# Patient Record
Sex: FEMALE | Race: OTHER | NOT HISPANIC OR LATINO | Employment: FULL TIME | URBAN - METROPOLITAN AREA
[De-identification: names, ages, dates, MRNs, and addresses within clinical notes are randomized per-mention and may not be internally consistent; named-entity substitution may affect disease eponyms.]

---

## 2023-05-18 LAB
CHLAMYDIA TRACH., AMPLIFIED: NEGATIVE
N. GONORRHEA, AMPLIFIED: NEGATIVE

## 2023-05-19 LAB — URINE CULTURE: NORMAL

## 2023-05-27 LAB
ABO GROUP (TYPE) IN BLOOD: NORMAL
ANTIBODY SCREEN: NORMAL
ERYTHROCYTE DISTRIBUTION WIDTH (RATIO) BY AUTOMATED COUNT: 11.9 % (ref 11.5–14.5)
ERYTHROCYTE MEAN CORPUSCULAR HEMOGLOBIN CONCENTRATION (G/DL) BY AUTOMATED: 33.5 G/DL (ref 32–36)
ERYTHROCYTE MEAN CORPUSCULAR VOLUME (FL) BY AUTOMATED COUNT: 88 FL (ref 80–100)
ERYTHROCYTES (10*6/UL) IN BLOOD BY AUTOMATED COUNT: 4.52 X10E12/L (ref 4–5.2)
HEMATOCRIT (%) IN BLOOD BY AUTOMATED COUNT: 39.7 % (ref 36–46)
HEMOGLOBIN (G/DL) IN BLOOD: 13.3 G/DL (ref 12–16)
HEPATITIS B VIRUS SURFACE AG PRESENCE IN SERUM: NONREACTIVE
HIV 1/ 2 AG/AB SCREEN: NONREACTIVE
LEUKOCYTES (10*3/UL) IN BLOOD BY AUTOMATED COUNT: 11.7 X10E9/L (ref 4.4–11.3)
PLATELETS (10*3/UL) IN BLOOD AUTOMATED COUNT: 346 X10E9/L (ref 150–450)
REFLEX ADDED, ANEMIA PANEL: ABNORMAL
RH FACTOR: NORMAL

## 2023-05-28 LAB
RUBELLA VIRUS IGG AB: NEGATIVE
SYPHILIS TOTAL AB: NONREACTIVE

## 2023-08-27 PROBLEM — N93.0 BLEEDING AFTER INTERCOURSE: Status: ACTIVE | Noted: 2023-08-27

## 2023-08-27 PROBLEM — N92.1 BREAKTHROUGH BLEEDING ON BIRTH CONTROL PILLS: Status: ACTIVE | Noted: 2023-08-27

## 2023-08-27 PROBLEM — L70.9 ACNE: Status: ACTIVE | Noted: 2023-08-27

## 2023-08-27 PROBLEM — R30.0 DYSURIA: Status: ACTIVE | Noted: 2023-08-27

## 2023-08-27 PROBLEM — R10.9 ABDOMINAL DISCOMFORT: Status: ACTIVE | Noted: 2023-08-27

## 2023-08-27 PROBLEM — N94.6 DYSMENORRHEA: Status: ACTIVE | Noted: 2023-08-27

## 2023-08-27 PROBLEM — R63.5 ABNORMAL WEIGHT GAIN: Status: ACTIVE | Noted: 2023-08-27

## 2023-08-27 PROBLEM — K21.9 GASTROESOPHAGEAL REFLUX DISEASE WITHOUT ESOPHAGITIS: Status: ACTIVE | Noted: 2023-08-27

## 2023-08-27 PROBLEM — M54.50 LOW BACK PAIN: Status: ACTIVE | Noted: 2023-08-27

## 2023-08-27 PROBLEM — N92.6 IRREGULAR MENSES: Status: ACTIVE | Noted: 2023-08-27

## 2023-08-27 PROBLEM — R82.998 LEUKOCYTES IN URINE: Status: ACTIVE | Noted: 2023-08-27

## 2023-08-27 PROBLEM — R35.0 URINARY FREQUENCY: Status: ACTIVE | Noted: 2023-08-27

## 2023-08-27 PROBLEM — M41.9 SCOLIOSIS: Status: ACTIVE | Noted: 2023-08-27

## 2023-08-27 RX ORDER — SODIUM SULFACETAMIDE AND SULFUR 80; 40 MG/ML; MG/ML
SOLUTION TOPICAL
COMMUNITY
Start: 2021-08-16 | End: 2023-10-05

## 2023-08-27 RX ORDER — FAMOTIDINE 20 MG/1
1 TABLET, FILM COATED ORAL EVERY 12 HOURS
COMMUNITY
Start: 2023-05-17 | End: 2024-01-03 | Stop reason: WASHOUT

## 2023-08-27 RX ORDER — CLINDAMYCIN HYDROCHLORIDE 300 MG/1
1 CAPSULE ORAL EVERY 12 HOURS
COMMUNITY
Start: 2022-06-21 | End: 2023-10-05 | Stop reason: WASHOUT

## 2023-08-27 RX ORDER — FOLIC ACID, .BETA.-CAROTENE, ASCORBIC ACID, CHOLECALCIFEROL, .ALPHA.-TOCOPHEROL ACETATE, DL-, THIAMINE MONONITRATE, RIBOFLAVIN, NIACINAMIDE, PYRIDOXINE HYDROCHLORIDE, CYANOCOBALAMIN, CALCIUM PANTOTHENATE, CALCIUM CARBONATE, FERROUS FUMARATE, AND ZINC OXIDE 1; 1000; 100; 400; 30; 3; 3; 15; 20; 12; 7; 200; 29; 20 MG/1; [IU]/1; MG/1; [IU]/1; [IU]/1; MG/1; MG/1; MG/1; MG/1; UG/1; MG/1; MG/1; MG/1; MG/1
1 TABLET, CHEWABLE ORAL DAILY
COMMUNITY
Start: 2022-11-10

## 2023-09-07 PROBLEM — Z34.02 ENCOUNTER FOR SUPERVISION OF NORMAL FIRST PREGNANCY IN SECOND TRIMESTER (HHS-HCC): Status: ACTIVE | Noted: 2023-09-07

## 2023-09-07 PROBLEM — O43.119: Status: ACTIVE | Noted: 2023-09-07

## 2023-09-13 LAB
ERYTHROCYTE DISTRIBUTION WIDTH (RATIO) BY AUTOMATED COUNT: 12.4 % (ref 11.5–14.5)
ERYTHROCYTE MEAN CORPUSCULAR HEMOGLOBIN CONCENTRATION (G/DL) BY AUTOMATED: 33.3 G/DL (ref 32–36)
ERYTHROCYTE MEAN CORPUSCULAR VOLUME (FL) BY AUTOMATED COUNT: 94 FL (ref 80–100)
ERYTHROCYTES (10*6/UL) IN BLOOD BY AUTOMATED COUNT: 4.11 X10E12/L (ref 4–5.2)
GLUCOSE, 1 HR SCREEN, PREG: 86 MG/DL
HEMATOCRIT (%) IN BLOOD BY AUTOMATED COUNT: 38.7 % (ref 36–46)
HEMOGLOBIN (G/DL) IN BLOOD: 12.9 G/DL (ref 12–16)
LEUKOCYTES (10*3/UL) IN BLOOD BY AUTOMATED COUNT: 11.3 X10E9/L (ref 4.4–11.3)
PLATELETS (10*3/UL) IN BLOOD AUTOMATED COUNT: 276 X10E9/L (ref 150–450)
REFLEX ADDED, ANEMIA PANEL: NORMAL
SYPHILIS TOTAL AB: NONREACTIVE

## 2023-10-04 PROBLEM — N93.0 BLEEDING AFTER INTERCOURSE: Status: RESOLVED | Noted: 2023-08-27 | Resolved: 2023-10-04

## 2023-10-04 PROBLEM — N94.6 DYSMENORRHEA: Status: RESOLVED | Noted: 2023-08-27 | Resolved: 2023-10-04

## 2023-10-04 PROBLEM — N92.1 BREAKTHROUGH BLEEDING ON BIRTH CONTROL PILLS: Status: RESOLVED | Noted: 2023-08-27 | Resolved: 2023-10-04

## 2023-10-04 PROBLEM — N92.6 IRREGULAR MENSES: Status: RESOLVED | Noted: 2023-08-27 | Resolved: 2023-10-04

## 2023-10-04 PROBLEM — Z34.03 ENCOUNTER FOR SUPERVISION OF NORMAL FIRST PREGNANCY IN THIRD TRIMESTER (HHS-HCC): Status: ACTIVE | Noted: 2023-09-07

## 2023-10-04 PROBLEM — R30.0 DYSURIA: Status: RESOLVED | Noted: 2023-08-27 | Resolved: 2023-10-04

## 2023-10-05 ENCOUNTER — ROUTINE PRENATAL (OUTPATIENT)
Dept: OBSTETRICS AND GYNECOLOGY | Facility: CLINIC | Age: 23
End: 2023-10-05
Payer: COMMERCIAL

## 2023-10-05 VITALS — BODY MASS INDEX: 23.81 KG/M2 | WEIGHT: 126 LBS | DIASTOLIC BLOOD PRESSURE: 70 MMHG | SYSTOLIC BLOOD PRESSURE: 102 MMHG

## 2023-10-05 DIAGNOSIS — Z23 NEED FOR DIPHTHERIA-TETANUS-PERTUSSIS (TDAP) VACCINE: ICD-10-CM

## 2023-10-05 DIAGNOSIS — O43.113 CIRCUMVALLATE PLACENTA IN THIRD TRIMESTER (HHS-HCC): Primary | ICD-10-CM

## 2023-10-05 PROCEDURE — 90471 IMMUNIZATION ADMIN: CPT | Performed by: OBSTETRICS & GYNECOLOGY

## 2023-10-05 PROCEDURE — 90715 TDAP VACCINE 7 YRS/> IM: CPT | Performed by: OBSTETRICS & GYNECOLOGY

## 2023-10-05 PROCEDURE — 0501F PRENATAL FLOW SHEET: CPT | Performed by: OBSTETRICS & GYNECOLOGY

## 2023-10-05 NOTE — PROGRESS NOTES
"Subjective   Patient ID [id number]   Marilou Kaufman is a 23 y.o.  at 28w5d with a working estimated date of delivery of 2023, by Ultrasound who presents for a routine prenatal visit. She denies vaginal bleeding, leakage of fluid, decreased fetal movements, or contractions. She does note reflux about half the days. We discussed taking famotidine regularly to prevent. She also notes hemorrhoids. These are not painful but can bleed. She denies constipation. Will try Preparation H.     Her pregnancy is complicated by:  Circumvalate placenta.    Objective   Physical Exam:   Weight: 57.2 kg (126 lb)  Expected Total Weight Gain: 11.5 kg (25 lb)-16 kg (35 lb)   Pregravid BMI: 18.90  BP: 102/70  Fetal Heart Rate: 125 Fundal Height (cm): 28 cm Presentation: Vertex           Prenatal Labs  Urine Dip:  No results found for: \"KETONESU\", \"GLUCOSEUR\", \"LEUKOCYTESUR\"  Lab Results   Component Value Date    HGB 12.9 2023    HCT 38.7 2023    HEPBSAG NONREACTIVE 2023            Assessment/Plan   Problem List Items Addressed This Visit             ICD-10-CM    Circumvallate placenta - Primary O43.119     Serial follow up imaging is planned per [facility].           Continue prenatal vitamin.  Labs reviewed.  Follow up ultrasound is next Friday.  Expected mode of delivery vaginal.  Follow up in 1 week for a routine prenatal visit.  "

## 2023-10-13 ENCOUNTER — ANCILLARY PROCEDURE (OUTPATIENT)
Dept: RADIOLOGY | Facility: CLINIC | Age: 23
End: 2023-10-13
Payer: COMMERCIAL

## 2023-10-13 DIAGNOSIS — O43.113 CIRCUMVALLATE PLACENTA IN THIRD TRIMESTER (HHS-HCC): ICD-10-CM

## 2023-10-13 PROCEDURE — 76816 OB US FOLLOW-UP PER FETUS: CPT

## 2023-10-13 PROCEDURE — 76819 FETAL BIOPHYS PROFIL W/O NST: CPT | Performed by: OBSTETRICS & GYNECOLOGY

## 2023-10-13 PROCEDURE — 76816 OB US FOLLOW-UP PER FETUS: CPT | Performed by: OBSTETRICS & GYNECOLOGY

## 2023-10-15 PROBLEM — O43.123 VELAMENTOUS INSERTION OF UMBILICAL CORD IN THIRD TRIMESTER (HHS-HCC): Status: ACTIVE | Noted: 2023-10-15

## 2023-10-15 PROBLEM — Z3A.30 30 WEEKS GESTATION OF PREGNANCY (HHS-HCC): Status: ACTIVE | Noted: 2023-10-15

## 2023-10-15 NOTE — ASSESSMENT & PLAN NOTE
NIPS negative. Vegan diet.   Circumvallate placenta with velamentous cord insertion are noted on MFM imaging. Growth remains AGA. 36 week imaging is planned per [facility].

## 2023-10-19 ENCOUNTER — ROUTINE PRENATAL (OUTPATIENT)
Dept: OBSTETRICS AND GYNECOLOGY | Facility: CLINIC | Age: 23
End: 2023-10-19
Payer: COMMERCIAL

## 2023-10-19 VITALS — WEIGHT: 129 LBS | SYSTOLIC BLOOD PRESSURE: 110 MMHG | BODY MASS INDEX: 24.37 KG/M2 | DIASTOLIC BLOOD PRESSURE: 60 MMHG

## 2023-10-19 DIAGNOSIS — O43.123 VELAMENTOUS INSERTION OF UMBILICAL CORD IN THIRD TRIMESTER (HHS-HCC): ICD-10-CM

## 2023-10-19 DIAGNOSIS — Z23 NEED FOR DIPHTHERIA-TETANUS-PERTUSSIS (TDAP) VACCINE: ICD-10-CM

## 2023-10-19 DIAGNOSIS — Z3A.30 30 WEEKS GESTATION OF PREGNANCY (HHS-HCC): ICD-10-CM

## 2023-10-19 DIAGNOSIS — O43.113 CIRCUMVALLATE PLACENTA IN THIRD TRIMESTER (HHS-HCC): Primary | ICD-10-CM

## 2023-10-19 PROCEDURE — 0501F PRENATAL FLOW SHEET: CPT | Performed by: OBSTETRICS & GYNECOLOGY

## 2023-10-19 NOTE — PROGRESS NOTES
"Subjective   Patient ID [id number]   Marilou Kaufman is a 23 y.o.  at 30w5d with a working estimated date of delivery of 2023, by Ultrasound who presents for a routine prenatal visit. She denies vaginal bleeding, leakage of fluid, decreased fetal movements, or contractions. She is doing well.     Her pregnancy is complicated by:  Velamentous and circumvallate placenta.    Objective   Physical Exam:   Weight: 58.5 kg (129 lb)  Expected Total Weight Gain: 11.5 kg (25 lb)-16 kg (35 lb)   Pregravid BMI: 18.90  BP: 110/60  Fetal Heart Rate: 140 Fundal Height (cm): 29 cm Presentation: Vertex           Prenatal Labs  Urine Dip:  No results found for: \"KETONESU\", \"GLUCOSEUR\", \"LEUKOCYTESUR\"  Lab Results   Component Value Date    HGB 12.9 2023    HCT 38.7 2023    ABO O 2023    HEPBSAG NONREACTIVE 2023     No results found for: \"PAPPA\", \"AFP\", \"HCG\", \"ESTRIOL\", \"INHBA\"         Assessment/Plan   Problem List Items Addressed This Visit             ICD-10-CM    Circumvallate placenta - Primary O43.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 Vegan diet.   Circumvallate placenta with velamentous cord insertion are noted on MFM imaging. Growth remains AGA. 36 week imaging is planned per M.           Other Visit Diagnoses         Codes    Need for diphtheria-tetanus-pertussis (Tdap) vaccine     Z23    Relevant Orders    Tdap vaccine, age 7 years and older  (BOOSTRIX)          Continue prenatal vitamin.  Labs reviewed.  mode of delivery vaginal  Follow up in 2 week for a routine prenatal visit.  "

## 2023-10-31 PROBLEM — Z3A.32 32 WEEKS GESTATION OF PREGNANCY (HHS-HCC): Status: ACTIVE | Noted: 2023-10-15

## 2023-11-02 ENCOUNTER — ROUTINE PRENATAL (OUTPATIENT)
Dept: OBSTETRICS AND GYNECOLOGY | Facility: CLINIC | Age: 23
End: 2023-11-02
Payer: COMMERCIAL

## 2023-11-02 VITALS — BODY MASS INDEX: 24.75 KG/M2 | SYSTOLIC BLOOD PRESSURE: 118 MMHG | DIASTOLIC BLOOD PRESSURE: 60 MMHG | WEIGHT: 131 LBS

## 2023-11-02 DIAGNOSIS — Z3A.32 32 WEEKS GESTATION OF PREGNANCY (HHS-HCC): ICD-10-CM

## 2023-11-02 DIAGNOSIS — O43.113 CIRCUMVALLATE PLACENTA IN THIRD TRIMESTER (HHS-HCC): Primary | ICD-10-CM

## 2023-11-02 DIAGNOSIS — O43.123 VELAMENTOUS INSERTION OF UMBILICAL CORD IN THIRD TRIMESTER (HHS-HCC): ICD-10-CM

## 2023-11-02 PROCEDURE — 0501F PRENATAL FLOW SHEET: CPT | Performed by: OBSTETRICS & GYNECOLOGY

## 2023-11-02 NOTE — PROGRESS NOTES
"Subjective   Patient ID [id number]   Marilou Kaufman is a 23 y.o.  at 32w1d with a working estimated date of delivery of 2023, by Last Menstrual Period who presents for a routine prenatal visit. She denies vaginal bleeding, leakage of fluid, decreased fetal movements, or contractions. She notes reflux at night. She will increase famotidine to twice daily.      Objective   Physical Exam  Weight: 59.4 kg (131 lb)  Expected Total Weight Gain: 11.5 kg (25 lb)-16 kg (35 lb)   Pregravid BMI: 18.90  BP: 118/60  Fetal Heart Rate: 128 Fundal Height (cm): 31 cm    Prenatal Labs  Urine dip:  No results found for: \"KETONESU\", \"GLUCOSEUR\", \"LEUKOCYTESUR\"    Lab Results   Component Value Date    HGB 12.9 2023    HCT 38.7 2023    ABO O 2023    HEPBSAG NONREACTIVE 2023         Problem List Items Addressed This Visit       Circumvallate placenta - Primary    Overview     Serial follow up imaging is planned per [facility].          Velamentous insertion of umbilical cord in third trimester    Overview     Circumvallate placenta and velamentous cord insertion are confirmed at 30 week [facility] imaging. Plan is for recheck at 36 weeks gestation.          32 weeks gestation of pregnancy    Overview     NIPS negative. Vegan diet. Adequate protein intake is encouraged                Continue prenatal vitamin.  Labs reviewed.  Follow up in 2 weeks. USN in 4 weeks.  Follow up as scheduled for a routine prenatal visit.  "

## 2023-11-09 PROBLEM — Z3A.34 34 WEEKS GESTATION OF PREGNANCY (HHS-HCC): Status: ACTIVE | Noted: 2023-10-15

## 2023-11-14 ENCOUNTER — ROUTINE PRENATAL (OUTPATIENT)
Dept: OBSTETRICS AND GYNECOLOGY | Facility: CLINIC | Age: 23
End: 2023-11-14
Payer: COMMERCIAL

## 2023-11-14 VITALS — WEIGHT: 131 LBS | BODY MASS INDEX: 24.75 KG/M2 | SYSTOLIC BLOOD PRESSURE: 118 MMHG | DIASTOLIC BLOOD PRESSURE: 80 MMHG

## 2023-11-14 DIAGNOSIS — O43.123 VELAMENTOUS INSERTION OF UMBILICAL CORD IN THIRD TRIMESTER (HHS-HCC): ICD-10-CM

## 2023-11-14 DIAGNOSIS — O43.113 CIRCUMVALLATE PLACENTA IN THIRD TRIMESTER (HHS-HCC): Primary | ICD-10-CM

## 2023-11-14 DIAGNOSIS — Z3A.34 34 WEEKS GESTATION OF PREGNANCY (HHS-HCC): ICD-10-CM

## 2023-11-14 PROCEDURE — 0501F PRENATAL FLOW SHEET: CPT | Performed by: OBSTETRICS & GYNECOLOGY

## 2023-11-14 NOTE — PROGRESS NOTES
"Subjective   Patient ID [id number]   Marilou Kaufman is a 23 y.o. who presents for a routine prenatal visit. She denies vaginal bleeding, leakage of fluid, decreased fetal movements, or contractions. No concerns. She is eating well.      Objective   Physical Exam  Weight: 59.4 kg (131 lb)  Expected Total Weight Gain: 11.5 kg (25 lb)-16 kg (35 lb)   Pregravid BMI: 18.90  BP: 118/80  Fetal Heart Rate: 130 Fundal Height (cm): 33 cm    Prenatal Labs  Urine dip:  No results found for: \"KETONESU\", \"GLUCOSEUR\", \"LEUKOCYTESUR\"    Lab Results   Component Value Date    HGB 12.9 09/13/2023    HCT 38.7 09/13/2023    ABO O 05/27/2023    HEPBSAG NONREACTIVE 05/27/2023         Problem List Items Addressed This Visit       Circumvallate placenta - Primary    Overview     Serial follow up imaging is planned per [facility].          Velamentous insertion of umbilical cord in third trimester    Overview     Circumvallate placenta and velamentous cord insertion are confirmed at 30 week [facility] imaging. Plan is for recheck at 36 weeks gestation.          34 weeks gestation of pregnancy    Overview     NIPS negative. Vegan diet. Adequate protein intake is encouraged                Continue prenatal vitamin.  Labs reviewed.  USN is scheduled.  Follow up as scheduled for a routine prenatal visit.  "

## 2023-11-27 PROBLEM — Z3A.36 36 WEEKS GESTATION OF PREGNANCY (HHS-HCC): Status: ACTIVE | Noted: 2023-10-15

## 2023-11-28 ENCOUNTER — PREP FOR PROCEDURE (OUTPATIENT)
Dept: OBSTETRICS AND GYNECOLOGY | Facility: CLINIC | Age: 23
End: 2023-11-28

## 2023-11-28 ENCOUNTER — ROUTINE PRENATAL (OUTPATIENT)
Dept: OBSTETRICS AND GYNECOLOGY | Facility: CLINIC | Age: 23
End: 2023-11-28
Payer: COMMERCIAL

## 2023-11-28 ENCOUNTER — ANCILLARY PROCEDURE (OUTPATIENT)
Dept: RADIOLOGY | Facility: CLINIC | Age: 23
End: 2023-11-28
Payer: COMMERCIAL

## 2023-11-28 ENCOUNTER — TELEPHONE (OUTPATIENT)
Dept: OBSTETRICS AND GYNECOLOGY | Facility: CLINIC | Age: 23
End: 2023-11-28

## 2023-11-28 VITALS — WEIGHT: 136 LBS | BODY MASS INDEX: 25.7 KG/M2 | SYSTOLIC BLOOD PRESSURE: 116 MMHG | DIASTOLIC BLOOD PRESSURE: 84 MMHG

## 2023-11-28 DIAGNOSIS — Z3A.36 36 WEEKS GESTATION OF PREGNANCY (HHS-HCC): ICD-10-CM

## 2023-11-28 DIAGNOSIS — O43.113 CIRCUMVALLATE PLACENTA IN THIRD TRIMESTER (HHS-HCC): Primary | ICD-10-CM

## 2023-11-28 DIAGNOSIS — O43.123 VELAMENTOUS INSERTION OF UMBILICAL CORD IN THIRD TRIMESTER (HHS-HCC): ICD-10-CM

## 2023-11-28 PROCEDURE — 76819 FETAL BIOPHYS PROFIL W/O NST: CPT | Performed by: OBSTETRICS & GYNECOLOGY

## 2023-11-28 PROCEDURE — 76816 OB US FOLLOW-UP PER FETUS: CPT

## 2023-11-28 PROCEDURE — 0501F PRENATAL FLOW SHEET: CPT | Performed by: OBSTETRICS & GYNECOLOGY

## 2023-11-28 PROCEDURE — 87081 CULTURE SCREEN ONLY: CPT

## 2023-11-28 PROCEDURE — 76816 OB US FOLLOW-UP PER FETUS: CPT | Performed by: OBSTETRICS & GYNECOLOGY

## 2023-11-28 PROCEDURE — 76819 FETAL BIOPHYS PROFIL W/O NST: CPT

## 2023-11-28 RX ORDER — TERBUTALINE SULFATE 1 MG/ML
0.25 INJECTION SUBCUTANEOUS ONCE
Status: CANCELLED | OUTPATIENT
Start: 2023-11-28 | End: 2023-11-28

## 2023-11-28 NOTE — TELEPHONE ENCOUNTER
----- Message from Eugenia Guadarrama MD sent at 11/28/2023  2:25 PM EST -----  Regarding: RE: version  12/5 at 2 should be fine. I will attempt to place orders so the spot is held. Can you call the patient Oralia to tell her please? NPO for 8 hours prior. She may have clear fluids up to 2 hours prior.  ----- Message -----  From: Brittanie Reynolds  Sent: 11/28/2023   2:04 PM EST  To: Eugenia Guadarrama MD  Subject: RE: version                                      We're all good to do 12/5.  We usually do 2p for ECVs.  Is that ok?  ----- Message -----  From: Eugenia Guadarrama MD  Sent: 11/28/2023   1:31 PM EST  To: Brittanie Reynolds  Subject: version                                          Marilou desires external cephalic version with Dr. Gandara who has agreed. Please see if this can be scheduled for next week on her day. I think this is Tuesday? Please let me know if orders will then be needed once the date is confirmed. Thanks.

## 2023-11-28 NOTE — PROGRESS NOTES
"Subjective   Patient ID [id number]   Marilou Kaufman is a 23 y.o. who presents for a routine prenatal visit. She denies vaginal bleeding, leakage of fluid, decreased fetal movements, or contractions.  Breech presentation was found on ultrasound prior to today's visit. Report is not yet available.     Objective   Physical Exam  Weight: 61.7 kg (136 lb)  Expected Total Weight Gain: 11.5 kg (25 lb)-16 kg (35 lb)   Pregravid BMI: 18.90  BP: 116/84  Fetal Heart Rate: 140 Fundal Height (cm): 35 cm    Prenatal Labs  Urine dip:  No results found for: \"KETONESU\", \"GLUCOSEUR\", \"LEUKOCYTESUR\"    Lab Results   Component Value Date    HGB 12.9 2023    HCT 38.7 2023    ABO O 2023    HEPBSAG NONREACTIVE 2023         Problem List Items Addressed This Visit       Circumvallate placenta - Primary    Overview     Serial follow up imaging is planned per [facility].          Velamentous insertion of umbilical cord in third trimester    Overview     Circumvallate placenta and velamentous cord insertion are confirmed at 30 week [facility] imaging. Plan is for recheck at 36 weeks gestation.          36 weeks gestation of pregnancy    Overview     NIPS negative. Vegan diet. Adequate protein intake is encouraged            Breech presentation of fetus    Overview     Breech on 36 week usn.  Posterior placenta, BHASKAR 18. She desires trial at external cephalic version at 37 weeks.   Risks of failure, discomfort, injury to fetus, uterus,  labor, placental abruption, PROM, fetal distress, need for urgent  delivery and fetal death were reviewed.              Continue prenatal vitamin.  Labs reviewed.  GBBS was obtained.   She desires to schedule attempt at external cephalic version. Risks were reviewed including decreased risk of success given primigravida status.   Follow up as scheduled for a routine prenatal visit.  "

## 2023-11-30 LAB — GP B STREP GENITAL QL CULT: NORMAL

## 2023-12-05 ENCOUNTER — ANESTHESIA (OUTPATIENT)
Dept: OBSTETRICS AND GYNECOLOGY | Facility: HOSPITAL | Age: 23
End: 2023-12-05
Payer: COMMERCIAL

## 2023-12-05 ENCOUNTER — HOSPITAL ENCOUNTER (OUTPATIENT)
Facility: HOSPITAL | Age: 23
Discharge: HOME | End: 2023-12-05
Attending: OBSTETRICS & GYNECOLOGY | Admitting: OBSTETRICS & GYNECOLOGY
Payer: COMMERCIAL

## 2023-12-05 ENCOUNTER — HOSPITAL ENCOUNTER (OUTPATIENT)
Facility: HOSPITAL | Age: 23
Setting detail: SURGERY ADMIT
End: 2023-12-05
Attending: OBSTETRICS & GYNECOLOGY | Admitting: OBSTETRICS & GYNECOLOGY
Payer: COMMERCIAL

## 2023-12-05 ENCOUNTER — ANESTHESIA EVENT (OUTPATIENT)
Dept: OBSTETRICS AND GYNECOLOGY | Facility: HOSPITAL | Age: 23
End: 2023-12-05
Payer: COMMERCIAL

## 2023-12-05 PROCEDURE — 59412 ANTEPARTUM MANIPULATION: CPT | Performed by: OBSTETRICS & GYNECOLOGY

## 2023-12-05 PROCEDURE — 99212 OFFICE O/P EST SF 10 MIN: CPT | Performed by: OBSTETRICS & GYNECOLOGY

## 2023-12-05 PROCEDURE — 99214 OFFICE O/P EST MOD 30 MIN: CPT

## 2023-12-05 NOTE — H&P
Obstetrical Triage History and Physical    Assessment/Plan   Marilou Kaufman IS A 23 y.o.  at 37w3d with breech malpresentation, velamentous cord insertion  -Discussed with MFM given velamentous cord insertion.  ECV not contraindicated, however, likely increased risks associated with procedure.  r/b/a discussed with patient and partner at length.  After discussion, patient would like to proceed with scheduled PLTCS if not cephalic at the time of admission  -Scheduled for 39w,  10a, arrive 2 hours prior at 8a.    Obstetrical History   OB History    Para Term  AB Living   1             SAB IAB Ectopic Multiple Live Births                  # Outcome Date GA Lbr Ankur/2nd Weight Sex Delivery Anes PTL Lv   1 Current               Obstetric Comments   28 wk 5 day,thinks nshe may have hemorrhoids, wants T-dap, she has not yet picked a pediatrician.       Past Medical History  Past Medical History:   Diagnosis Date    Bleeding after intercourse 2023    Breakthrough bleeding on birth control pills 2023    Dysmenorrhea 2023    Dysuria 2023    Irregular menses 2023    Personal history of other diseases of the musculoskeletal system and connective tissue 2021    History of low back pain    Personal history of other diseases of the musculoskeletal system and connective tissue 2021    History of low back pain    Personal history of other diseases of the respiratory system     History of asthma        Past Surgical History   Past Surgical History:   Procedure Laterality Date    OTHER SURGICAL HISTORY  2018    Dermatological surgery       Social History  Social History     Tobacco Use    Smoking status: Never    Smokeless tobacco: Never   Substance Use Topics    Alcohol use: Not on file     Substance and Sexual Activity   Drug Use Not on file       Allergies  Patient has no known allergies.     Medications  Medications Prior to Admission   Medication  Sig Dispense Refill Last Dose    famotidine (Pepcid) 20 mg tablet Take 1 tablet (20 mg) by mouth every 12 hours.       Prenatal 19 29 mg iron- 1 mg tablet,chewable Chew 1 tablet once daily. CHEW AND SWALLOW          Subjective  Marilou Kaufman is a 23 y.o.  at 37w3d     This pregnancy has been notable for:      Objective    Last Vitals  Temp Pulse Resp BP MAP O2 Sat                   Physical Examination  Gen:  Alert, oriented, well-nourished, NAD  Pulm:  Normal respiratory effort  Abd:  Gravid, soft, nontender  Obstetrics  BSUS:  Sebastian breech, fluid wnl, BPP 8/8  Neuro:  Grossly intact      Lab Review  Mom Prenatal Results      Prenatal Results      1st Trimester       Test Value Reference Range Date Time    CBC w/ Reflex        HGB  12.9 g/dL 12.0 - 16.0 23 0913       13.3 g/dL 12.0 - 16.0 23 1037    HCT  38.7 % 36.0 - 46.0 23 0913       39.7 % 36.0 - 46.0 23 1037    Platelet Count  276 x10E9/L 150 - 450 23 0913       346 x10E9/L 150 - 450 23 1037    MCV  94 fL 80 - 100 23 0913       88 fL 80 - 100 23 1037    Blood type (ABO)  O   23 1037    Rh Type  POS   23 1037    Antibody Screen  NEG   23 1037    Hemoglobin Identification        Manual Hemoglobin Identification        Chlamydia and Gonorrhea Screening  (See Report)    23 0853    Chlamydia Screen  NEGATIVE  Negative 23 0853    Gonorrhea Screen  NEGATIVE  Negative 23 0853    Syphilis Screening w/ Reflex  NONREACTIVE  NONREACTIVE 23 0913       NONREACTIVE  NONREACTIVE 23 1037    Rubella  Negative   23 1037    Hep C        Urine Culture  NO SIGNIFICANT GROWTH.   23 1009    Map Positive Urine GBS for Storyboard        P/C Ratio        HBsAg  NONREACTIVE  NONREACTIVE 23 1037    HIV-1 and HIV-2 Antibodies  NONREACTIVE  NONREACTIVE 23 1037    TSH with Reflex         T4 Free        Varicella        Pap Smear        HbA1c        1 Hour  Glucola  86 mg/dL <135 09/13/23 0913    Fasting Glucose Tolerance 3 hour        GTT, 1 hour        GTT, 2 hour        GTT, 3 hour        cfDNA              2nd Trimester       Test Value Reference Range Date Time    CBC w/ Reflex        HGB  12.9 g/dL 12.0 - 16.0 09/13/23 0913       13.3 g/dL 12.0 - 16.0 05/27/23 1037    HCT  38.7 % 36.0 - 46.0 09/13/23 0913       39.7 % 36.0 - 46.0 05/27/23 1037    Platelet Count  276 x10E9/L 150 - 450 09/13/23 0913       346 x10E9/L 150 - 450 05/27/23 1037    MCV  94 fL 80 - 100 09/13/23 0913       88 fL 80 - 100 05/27/23 1037    Blood Type (ABO)        Rh Type        Antibody Screen  NEG   05/27/23 1037    Chlamydia and Gohorrhea Screening  (See Report)    05/17/23 0853    Chlamydia Screen  NEGATIVE  Negative 05/17/23 0853    Gonorrhea Screen  NEGATIVE  Negative 05/17/23 0853    Syphilis Screening w/ Reflex  NONREACTIVE  NONREACTIVE 09/13/23 0913       NONREACTIVE  NONREACTIVE 05/27/23 1037    HbA1c        1 Hour Glucola  86 mg/dL <135 09/13/23 0913    Fasting Glucose Tolerance 3 hour        GTT, 1 hour        GTT, 2 hours        GTT, 3 hours              3rd Trimester       Test Value Reference Range Date Time    GBS        CBC w/ Reflex        HGB        HCT        Platelet Count        MCV              Legend    ^: Historical

## 2023-12-07 ENCOUNTER — ROUTINE PRENATAL (OUTPATIENT)
Dept: OBSTETRICS AND GYNECOLOGY | Facility: CLINIC | Age: 23
End: 2023-12-07
Payer: COMMERCIAL

## 2023-12-07 VITALS — BODY MASS INDEX: 25.89 KG/M2 | SYSTOLIC BLOOD PRESSURE: 102 MMHG | DIASTOLIC BLOOD PRESSURE: 70 MMHG | WEIGHT: 137 LBS

## 2023-12-07 DIAGNOSIS — O43.123 VELAMENTOUS INSERTION OF UMBILICAL CORD IN THIRD TRIMESTER (HHS-HCC): ICD-10-CM

## 2023-12-07 DIAGNOSIS — Z3A.37 37 WEEKS GESTATION OF PREGNANCY (HHS-HCC): Primary | ICD-10-CM

## 2023-12-07 DIAGNOSIS — O43.113 CIRCUMVALLATE PLACENTA IN THIRD TRIMESTER (HHS-HCC): ICD-10-CM

## 2023-12-07 PROCEDURE — 0501F PRENATAL FLOW SHEET: CPT | Performed by: OBSTETRICS & GYNECOLOGY

## 2023-12-07 NOTE — PROGRESS NOTES
"Subjective   Patient ID [id number]   Marilou Kaufman is a 23 y.o. who presents for a routine prenatal visit. She denies vaginal bleeding, leakage of fluid, decreased fetal movements, or contractions.  She is scheduled for CS on 12/18.    Objective   Physical Exam  Weight: 62.1 kg (137 lb)  Expected Total Weight Gain: 11.5 kg (25 lb)-16 kg (35 lb)   Pregravid BMI: 18.90  BP: 102/70         Prenatal Labs  Urine dip:  No results found for: \"KETONESU\", \"GLUCOSEUR\", \"LEUKOCYTESUR\"    Lab Results   Component Value Date    HGB 12.9 09/13/2023    HCT 38.7 09/13/2023    ABO O 05/27/2023    HEPBSAG NONREACTIVE 05/27/2023         Problem List Items Addressed This Visit       Circumvallate placenta    Overview     Serial follow up imaging is planned per [facility].          Velamentous insertion of umbilical cord in third trimester    Overview     Circumvallate placenta and velamentous cord insertion are confirmed at 30 week [facility] imaging.   EFW at 36 weeks 2765g, 36% and breech presentation.         37 weeks gestation of pregnancy - Primary    Overview     NIPS negative. Vegan diet. Adequate protein intake is encouraged                Continue prenatal vitamin.  Labs reviewed.  CS is scheduled on 12/18.  Follow up as scheduled for a routine prenatal visit.  "

## 2023-12-12 PROBLEM — Z3A.38 38 WEEKS GESTATION OF PREGNANCY (HHS-HCC): Status: ACTIVE | Noted: 2023-10-15

## 2023-12-14 ENCOUNTER — ROUTINE PRENATAL (OUTPATIENT)
Dept: OBSTETRICS AND GYNECOLOGY | Facility: CLINIC | Age: 23
End: 2023-12-14
Payer: COMMERCIAL

## 2023-12-14 VITALS — WEIGHT: 138 LBS | SYSTOLIC BLOOD PRESSURE: 118 MMHG | DIASTOLIC BLOOD PRESSURE: 76 MMHG | BODY MASS INDEX: 26.07 KG/M2

## 2023-12-14 DIAGNOSIS — O43.113 CIRCUMVALLATE PLACENTA IN THIRD TRIMESTER (HHS-HCC): ICD-10-CM

## 2023-12-14 DIAGNOSIS — Z3A.38 38 WEEKS GESTATION OF PREGNANCY (HHS-HCC): ICD-10-CM

## 2023-12-14 DIAGNOSIS — O43.123 VELAMENTOUS INSERTION OF UMBILICAL CORD IN THIRD TRIMESTER (HHS-HCC): Primary | ICD-10-CM

## 2023-12-14 PROCEDURE — 59426 ANTEPARTUM CARE ONLY: CPT | Performed by: OBSTETRICS & GYNECOLOGY

## 2023-12-14 NOTE — PROGRESS NOTES
"Subjective   Patient ID [id number]   Marilou Kaufman is a 23 y.o. who presents for a routine prenatal visit. She denies vaginal bleeding, leakage of fluid, decreased fetal movements, or contractions.      Objective   Physical Exam  Weight: 62.6 kg (138 lb)  Expected Total Weight Gain: 11.5 kg (25 lb)-16 kg (35 lb)   Pregravid BMI: 18.90  BP: 118/76  Fetal Heart Rate: 140 Fundal Height (cm): 38 cm    Prenatal Labs  Urine dip:  No results found for: \"KETONESU\", \"GLUCOSEUR\", \"LEUKOCYTESUR\"    Lab Results   Component Value Date    HGB 12.9 09/13/2023    HCT 38.7 09/13/2023    ABO O 05/27/2023    HEPBSAG NONREACTIVE 05/27/2023         Problem List Items Addressed This Visit       Circumvallate placenta    Overview     Serial follow up imaging is planned per [facility].          Velamentous insertion of umbilical cord in third trimester - Primary    Overview     Circumvallate placenta and velamentous cord insertion are confirmed at 30 week [facility] imaging.   EFW at 36 weeks 2765g, 36% and breech presentation.         38 weeks gestation of pregnancy    Overview     NIPS negative. Vegan diet. Adequate protein intake is encouraged            Relevant Orders    CBC    Type And Screen    Breech presentation of fetus    Overview     Breech on 36 week usn.  Posterior placenta, BHASKAR 18. She desires CS for delivery. CS 12/18 at 10:00.             Continue prenatal vitamin.  Labs reviewed.  Surgery was discussed and lab order given for preop labs. Orders were placed at time of scheduling at [facility].  Follow up as scheduled for a routine prenatal visit.  "

## 2023-12-16 ENCOUNTER — LAB (OUTPATIENT)
Dept: LAB | Facility: LAB | Age: 23
End: 2023-12-16
Payer: COMMERCIAL

## 2023-12-16 DIAGNOSIS — Z3A.38 38 WEEKS GESTATION OF PREGNANCY (HHS-HCC): ICD-10-CM

## 2023-12-16 PROCEDURE — 85027 COMPLETE CBC AUTOMATED: CPT

## 2023-12-16 PROCEDURE — 86901 BLOOD TYPING SEROLOGIC RH(D): CPT

## 2023-12-16 PROCEDURE — 86850 RBC ANTIBODY SCREEN: CPT | Mod: OUT | Performed by: OBSTETRICS & GYNECOLOGY

## 2023-12-16 PROCEDURE — 86900 BLOOD TYPING SEROLOGIC ABO: CPT

## 2023-12-16 PROCEDURE — 86901 BLOOD TYPING SEROLOGIC RH(D): CPT | Mod: OUT | Performed by: OBSTETRICS & GYNECOLOGY

## 2023-12-16 PROCEDURE — 36415 COLL VENOUS BLD VENIPUNCTURE: CPT

## 2023-12-16 PROCEDURE — 86850 RBC ANTIBODY SCREEN: CPT

## 2023-12-17 ENCOUNTER — LAB REQUISITION (OUTPATIENT)
Dept: LAB | Facility: HOSPITAL | Age: 23
End: 2023-12-17
Payer: COMMERCIAL

## 2023-12-17 DIAGNOSIS — Z3A.38 38 WEEKS GESTATION OF PREGNANCY (HHS-HCC): ICD-10-CM

## 2023-12-17 LAB
ABO GROUP (TYPE) IN BLOOD: NORMAL
ANTIBODY SCREEN: NORMAL
ERYTHROCYTE [DISTWIDTH] IN BLOOD BY AUTOMATED COUNT: 12.8 % (ref 11.5–14.5)
HCT VFR BLD AUTO: 38.4 % (ref 36–46)
HGB BLD-MCNC: 12.5 G/DL (ref 12–16)
MCH RBC QN AUTO: 29 PG (ref 26–34)
MCHC RBC AUTO-ENTMCNC: 32.6 G/DL (ref 32–36)
MCV RBC AUTO: 89 FL (ref 80–100)
NRBC BLD-RTO: 0 /100 WBCS (ref 0–0)
PLATELET # BLD AUTO: 336 X10*3/UL (ref 150–450)
RBC # BLD AUTO: 4.31 X10*6/UL (ref 4–5.2)
RH FACTOR (ANTIGEN D): NORMAL
WBC # BLD AUTO: 11.2 X10*3/UL (ref 4.4–11.3)

## 2023-12-18 ENCOUNTER — ANESTHESIA (OUTPATIENT)
Dept: OBSTETRICS AND GYNECOLOGY | Facility: HOSPITAL | Age: 23
End: 2023-12-18
Payer: COMMERCIAL

## 2023-12-18 ENCOUNTER — HOSPITAL ENCOUNTER (INPATIENT)
Facility: HOSPITAL | Age: 23
LOS: 2 days | Discharge: HOME | End: 2023-12-20
Attending: OBSTETRICS & GYNECOLOGY | Admitting: OBSTETRICS & GYNECOLOGY
Payer: COMMERCIAL

## 2023-12-18 ENCOUNTER — ANESTHESIA EVENT (OUTPATIENT)
Dept: OBSTETRICS AND GYNECOLOGY | Facility: HOSPITAL | Age: 23
End: 2023-12-18
Payer: COMMERCIAL

## 2023-12-18 DIAGNOSIS — R52 POSTPARTUM PAIN (HHS-HCC): ICD-10-CM

## 2023-12-18 PROBLEM — Z3A.39 PREGNANCY WITH 39 COMPLETED WEEKS GESTATION (HHS-HCC): Status: ACTIVE | Noted: 2023-12-18

## 2023-12-18 LAB
ABO GROUP (TYPE) IN BLOOD: NORMAL
ERYTHROCYTE [DISTWIDTH] IN BLOOD BY AUTOMATED COUNT: 12.8 % (ref 11.5–14.5)
HCT VFR BLD AUTO: 36.8 % (ref 36–46)
HGB BLD-MCNC: 12 G/DL (ref 12–16)
MCH RBC QN AUTO: 28.2 PG (ref 26–34)
MCHC RBC AUTO-ENTMCNC: 32.6 G/DL (ref 32–36)
MCV RBC AUTO: 86 FL (ref 80–100)
NRBC BLD-RTO: 0 /100 WBCS (ref 0–0)
PLATELET # BLD AUTO: 325 X10*3/UL (ref 150–450)
RBC # BLD AUTO: 4.26 X10*6/UL (ref 4–5.2)
RH FACTOR (ANTIGEN D): NORMAL
T PALLIDUM AB SER QL: NONREACTIVE
WBC # BLD AUTO: 9.7 X10*3/UL (ref 4.4–11.3)

## 2023-12-18 PROCEDURE — 96372 THER/PROPH/DIAG INJ SC/IM: CPT | Performed by: OBSTETRICS & GYNECOLOGY

## 2023-12-18 PROCEDURE — 2500000004 HC RX 250 GENERAL PHARMACY W/ HCPCS (ALT 636 FOR OP/ED): Performed by: NURSE ANESTHETIST, CERTIFIED REGISTERED

## 2023-12-18 PROCEDURE — 2500000004 HC RX 250 GENERAL PHARMACY W/ HCPCS (ALT 636 FOR OP/ED): Performed by: OBSTETRICS & GYNECOLOGY

## 2023-12-18 PROCEDURE — 2500000001 HC RX 250 WO HCPCS SELF ADMINISTERED DRUGS (ALT 637 FOR MEDICARE OP): Performed by: NURSE ANESTHETIST, CERTIFIED REGISTERED

## 2023-12-18 PROCEDURE — 7100000016 HC LABOR RECOVERY PER HOUR: Performed by: OBSTETRICS & GYNECOLOGY

## 2023-12-18 PROCEDURE — 59514 CESAREAN DELIVERY ONLY: CPT | Performed by: OBSTETRICS & GYNECOLOGY

## 2023-12-18 PROCEDURE — 3700000018 HC OB ANESTHESIA C-SECTION: Performed by: OBSTETRICS & GYNECOLOGY

## 2023-12-18 PROCEDURE — 59515 CESAREAN DELIVERY: CPT | Performed by: OBSTETRICS & GYNECOLOGY

## 2023-12-18 PROCEDURE — 2720000007 HC OR 272 NO HCPCS

## 2023-12-18 PROCEDURE — 2500000001 HC RX 250 WO HCPCS SELF ADMINISTERED DRUGS (ALT 637 FOR MEDICARE OP): Performed by: OBSTETRICS & GYNECOLOGY

## 2023-12-18 PROCEDURE — 51702 INSERT TEMP BLADDER CATH: CPT

## 2023-12-18 PROCEDURE — 88307 TISSUE EXAM BY PATHOLOGIST: CPT | Performed by: PATHOLOGY

## 2023-12-18 PROCEDURE — 36415 COLL VENOUS BLD VENIPUNCTURE: CPT | Performed by: OBSTETRICS & GYNECOLOGY

## 2023-12-18 PROCEDURE — 86780 TREPONEMA PALLIDUM: CPT | Mod: AHULAB | Performed by: OBSTETRICS & GYNECOLOGY

## 2023-12-18 PROCEDURE — 1100000001 HC PRIVATE ROOM DAILY

## 2023-12-18 PROCEDURE — A59514 PR CESAREAN DELIVERY ONLY: Performed by: NURSE ANESTHETIST, CERTIFIED REGISTERED

## 2023-12-18 PROCEDURE — 85027 COMPLETE CBC AUTOMATED: CPT | Performed by: OBSTETRICS & GYNECOLOGY

## 2023-12-18 PROCEDURE — 2500000005 HC RX 250 GENERAL PHARMACY W/O HCPCS: Performed by: NURSE ANESTHETIST, CERTIFIED REGISTERED

## 2023-12-18 PROCEDURE — 88307 TISSUE EXAM BY PATHOLOGIST: CPT | Mod: TC,SUR,AHULAB | Performed by: OBSTETRICS & GYNECOLOGY

## 2023-12-18 RX ORDER — FAMOTIDINE 20 MG/1
20 TABLET, FILM COATED ORAL EVERY 12 HOURS
Status: DISCONTINUED | OUTPATIENT
Start: 2023-12-18 | End: 2023-12-20 | Stop reason: HOSPADM

## 2023-12-18 RX ORDER — DEXAMETHASONE SODIUM PHOSPHATE 4 MG/ML
INJECTION, SOLUTION INTRA-ARTICULAR; INTRALESIONAL; INTRAMUSCULAR; INTRAVENOUS; SOFT TISSUE AS NEEDED
Status: DISCONTINUED | OUTPATIENT
Start: 2023-12-18 | End: 2023-12-18

## 2023-12-18 RX ORDER — PHENYLEPHRINE 10 MG/250 ML(40 MCG/ML)IN 0.9 % SOD.CHLORIDE INTRAVENOUS
CONTINUOUS PRN
Status: DISCONTINUED | OUTPATIENT
Start: 2023-12-18 | End: 2023-12-18

## 2023-12-18 RX ORDER — MISOPROSTOL 200 UG/1
800 TABLET ORAL ONCE AS NEEDED
Status: DISCONTINUED | OUTPATIENT
Start: 2023-12-18 | End: 2023-12-18

## 2023-12-18 RX ORDER — OXYTOCIN 10 [USP'U]/ML
10 INJECTION, SOLUTION INTRAMUSCULAR; INTRAVENOUS ONCE AS NEEDED
Status: DISCONTINUED | OUTPATIENT
Start: 2023-12-18 | End: 2023-12-20 | Stop reason: HOSPADM

## 2023-12-18 RX ORDER — METOCLOPRAMIDE HYDROCHLORIDE 5 MG/ML
10 INJECTION INTRAMUSCULAR; INTRAVENOUS EVERY 6 HOURS PRN
Status: DISCONTINUED | OUTPATIENT
Start: 2023-12-18 | End: 2023-12-18

## 2023-12-18 RX ORDER — ONDANSETRON 4 MG/1
4 TABLET, FILM COATED ORAL EVERY 6 HOURS PRN
Status: DISCONTINUED | OUTPATIENT
Start: 2023-12-18 | End: 2023-12-18

## 2023-12-18 RX ORDER — HYDRALAZINE HYDROCHLORIDE 20 MG/ML
5 INJECTION INTRAMUSCULAR; INTRAVENOUS ONCE AS NEEDED
Status: DISCONTINUED | OUTPATIENT
Start: 2023-12-18 | End: 2023-12-18

## 2023-12-18 RX ORDER — METOCLOPRAMIDE HYDROCHLORIDE 5 MG/ML
10 INJECTION INTRAMUSCULAR; INTRAVENOUS ONCE
Status: COMPLETED | OUTPATIENT
Start: 2023-12-18 | End: 2023-12-18

## 2023-12-18 RX ORDER — OXYCODONE HYDROCHLORIDE 5 MG/1
10 TABLET ORAL EVERY 4 HOURS PRN
Status: DISCONTINUED | OUTPATIENT
Start: 2023-12-19 | End: 2023-12-20 | Stop reason: HOSPADM

## 2023-12-18 RX ORDER — ONDANSETRON HYDROCHLORIDE 2 MG/ML
4 INJECTION, SOLUTION INTRAVENOUS EVERY 6 HOURS PRN
Status: DISCONTINUED | OUTPATIENT
Start: 2023-12-18 | End: 2023-12-18

## 2023-12-18 RX ORDER — SIMETHICONE 80 MG
80 TABLET,CHEWABLE ORAL 4 TIMES DAILY PRN
Status: DISCONTINUED | OUTPATIENT
Start: 2023-12-18 | End: 2023-12-20 | Stop reason: HOSPADM

## 2023-12-18 RX ORDER — FAMOTIDINE 10 MG/ML
20 INJECTION INTRAVENOUS ONCE
Status: COMPLETED | OUTPATIENT
Start: 2023-12-18 | End: 2023-12-18

## 2023-12-18 RX ORDER — KETOROLAC TROMETHAMINE 30 MG/ML
INJECTION, SOLUTION INTRAMUSCULAR; INTRAVENOUS AS NEEDED
Status: DISCONTINUED | OUTPATIENT
Start: 2023-12-18 | End: 2023-12-18

## 2023-12-18 RX ORDER — NALOXONE HYDROCHLORIDE 0.4 MG/ML
0.1 INJECTION, SOLUTION INTRAMUSCULAR; INTRAVENOUS; SUBCUTANEOUS EVERY 5 MIN PRN
Status: DISCONTINUED | OUTPATIENT
Start: 2023-12-18 | End: 2023-12-20 | Stop reason: HOSPADM

## 2023-12-18 RX ORDER — CEFAZOLIN 1 G/1
INJECTION, POWDER, FOR SOLUTION INTRAVENOUS AS NEEDED
Status: DISCONTINUED | OUTPATIENT
Start: 2023-12-18 | End: 2023-12-18

## 2023-12-18 RX ORDER — POLYETHYLENE GLYCOL 3350 17 G/17G
17 POWDER, FOR SOLUTION ORAL 2 TIMES DAILY PRN
Status: DISCONTINUED | OUTPATIENT
Start: 2023-12-18 | End: 2023-12-20 | Stop reason: HOSPADM

## 2023-12-18 RX ORDER — MORPHINE SULFATE 0.5 MG/ML
INJECTION, SOLUTION EPIDURAL; INTRATHECAL; INTRAVENOUS AS NEEDED
Status: DISCONTINUED | OUTPATIENT
Start: 2023-12-18 | End: 2023-12-18

## 2023-12-18 RX ORDER — CARBOPROST TROMETHAMINE 250 UG/ML
250 INJECTION, SOLUTION INTRAMUSCULAR ONCE AS NEEDED
Status: DISCONTINUED | OUTPATIENT
Start: 2023-12-18 | End: 2023-12-18

## 2023-12-18 RX ORDER — SODIUM CHLORIDE, SODIUM LACTATE, POTASSIUM CHLORIDE, CALCIUM CHLORIDE 600; 310; 30; 20 MG/100ML; MG/100ML; MG/100ML; MG/100ML
125 INJECTION, SOLUTION INTRAVENOUS CONTINUOUS
Status: DISCONTINUED | OUTPATIENT
Start: 2023-12-18 | End: 2023-12-18

## 2023-12-18 RX ORDER — ONDANSETRON 4 MG/1
4 TABLET, FILM COATED ORAL EVERY 6 HOURS PRN
Status: DISCONTINUED | OUTPATIENT
Start: 2023-12-18 | End: 2023-12-20 | Stop reason: HOSPADM

## 2023-12-18 RX ORDER — BUPIVACAINE HYDROCHLORIDE 7.5 MG/ML
INJECTION, SOLUTION INTRASPINAL AS NEEDED
Status: DISCONTINUED | OUTPATIENT
Start: 2023-12-18 | End: 2023-12-18

## 2023-12-18 RX ORDER — CEFAZOLIN SODIUM 2 G/100ML
2 INJECTION, SOLUTION INTRAVENOUS ONCE
Status: COMPLETED | OUTPATIENT
Start: 2023-12-18 | End: 2023-12-18

## 2023-12-18 RX ORDER — ADHESIVE BANDAGE
10 BANDAGE TOPICAL
Status: DISCONTINUED | OUTPATIENT
Start: 2023-12-18 | End: 2023-12-20 | Stop reason: HOSPADM

## 2023-12-18 RX ORDER — DIPHENHYDRAMINE HCL 25 MG
25 CAPSULE ORAL EVERY 4 HOURS PRN
Status: DISCONTINUED | OUTPATIENT
Start: 2023-12-18 | End: 2023-12-20 | Stop reason: HOSPADM

## 2023-12-18 RX ORDER — OXYTOCIN 10 [USP'U]/ML
10 INJECTION, SOLUTION INTRAMUSCULAR; INTRAVENOUS ONCE AS NEEDED
Status: DISCONTINUED | OUTPATIENT
Start: 2023-12-18 | End: 2023-12-18

## 2023-12-18 RX ORDER — LIDOCAINE HYDROCHLORIDE 10 MG/ML
30 INJECTION INFILTRATION; PERINEURAL ONCE AS NEEDED
Status: DISCONTINUED | OUTPATIENT
Start: 2023-12-18 | End: 2023-12-18

## 2023-12-18 RX ORDER — FENTANYL CITRATE 50 UG/ML
INJECTION, SOLUTION INTRAMUSCULAR; INTRAVENOUS AS NEEDED
Status: DISCONTINUED | OUTPATIENT
Start: 2023-12-18 | End: 2023-12-18

## 2023-12-18 RX ORDER — OXYTOCIN/0.9 % SODIUM CHLORIDE 30/500 ML
60 PLASTIC BAG, INJECTION (ML) INTRAVENOUS ONCE AS NEEDED
Status: DISCONTINUED | OUTPATIENT
Start: 2023-12-18 | End: 2023-12-18

## 2023-12-18 RX ORDER — OXYCODONE HYDROCHLORIDE 5 MG/1
5 TABLET ORAL EVERY 4 HOURS PRN
Status: DISCONTINUED | OUTPATIENT
Start: 2023-12-19 | End: 2023-12-20 | Stop reason: HOSPADM

## 2023-12-18 RX ORDER — ONDANSETRON HYDROCHLORIDE 2 MG/ML
4 INJECTION, SOLUTION INTRAVENOUS EVERY 6 HOURS PRN
Status: DISCONTINUED | OUTPATIENT
Start: 2023-12-18 | End: 2023-12-20 | Stop reason: HOSPADM

## 2023-12-18 RX ORDER — KETOROLAC TROMETHAMINE 30 MG/ML
30 INJECTION, SOLUTION INTRAMUSCULAR; INTRAVENOUS EVERY 6 HOURS
Status: COMPLETED | OUTPATIENT
Start: 2023-12-18 | End: 2023-12-19

## 2023-12-18 RX ORDER — HYDROMORPHONE HYDROCHLORIDE 1 MG/ML
0.2 INJECTION, SOLUTION INTRAMUSCULAR; INTRAVENOUS; SUBCUTANEOUS EVERY 5 MIN PRN
Status: DISCONTINUED | OUTPATIENT
Start: 2023-12-18 | End: 2023-12-20 | Stop reason: HOSPADM

## 2023-12-18 RX ORDER — SCOLOPAMINE TRANSDERMAL SYSTEM 1 MG/1
1 PATCH, EXTENDED RELEASE TRANSDERMAL ONCE AS NEEDED
Status: DISCONTINUED | OUTPATIENT
Start: 2023-12-18 | End: 2023-12-18

## 2023-12-18 RX ORDER — CARBOPROST TROMETHAMINE 250 UG/ML
250 INJECTION, SOLUTION INTRAMUSCULAR ONCE AS NEEDED
Status: DISCONTINUED | OUTPATIENT
Start: 2023-12-18 | End: 2023-12-20 | Stop reason: HOSPADM

## 2023-12-18 RX ORDER — BISACODYL 10 MG/1
10 SUPPOSITORY RECTAL DAILY PRN
Status: DISCONTINUED | OUTPATIENT
Start: 2023-12-18 | End: 2023-12-20 | Stop reason: HOSPADM

## 2023-12-18 RX ORDER — LOPERAMIDE HYDROCHLORIDE 2 MG/1
4 CAPSULE ORAL EVERY 2 HOUR PRN
Status: DISCONTINUED | OUTPATIENT
Start: 2023-12-18 | End: 2023-12-20 | Stop reason: HOSPADM

## 2023-12-18 RX ORDER — HYDRALAZINE HYDROCHLORIDE 20 MG/ML
5 INJECTION INTRAMUSCULAR; INTRAVENOUS ONCE AS NEEDED
Status: DISCONTINUED | OUTPATIENT
Start: 2023-12-18 | End: 2023-12-20 | Stop reason: HOSPADM

## 2023-12-18 RX ORDER — LOPERAMIDE HYDROCHLORIDE 2 MG/1
4 CAPSULE ORAL EVERY 2 HOUR PRN
Status: DISCONTINUED | OUTPATIENT
Start: 2023-12-18 | End: 2023-12-18

## 2023-12-18 RX ORDER — TERBUTALINE SULFATE 1 MG/ML
0.25 INJECTION SUBCUTANEOUS ONCE AS NEEDED
Status: DISCONTINUED | OUTPATIENT
Start: 2023-12-18 | End: 2023-12-18

## 2023-12-18 RX ORDER — LIDOCAINE 560 MG/1
1 PATCH PERCUTANEOUS; TOPICAL; TRANSDERMAL
Status: DISCONTINUED | OUTPATIENT
Start: 2023-12-18 | End: 2023-12-20 | Stop reason: HOSPADM

## 2023-12-18 RX ORDER — LABETALOL HYDROCHLORIDE 5 MG/ML
20 INJECTION, SOLUTION INTRAVENOUS ONCE AS NEEDED
Status: DISCONTINUED | OUTPATIENT
Start: 2023-12-18 | End: 2023-12-20 | Stop reason: HOSPADM

## 2023-12-18 RX ORDER — DIPHENHYDRAMINE HYDROCHLORIDE 50 MG/ML
25 INJECTION INTRAMUSCULAR; INTRAVENOUS EVERY 4 HOURS PRN
Status: DISCONTINUED | OUTPATIENT
Start: 2023-12-18 | End: 2023-12-20 | Stop reason: HOSPADM

## 2023-12-18 RX ORDER — MISOPROSTOL 200 UG/1
800 TABLET ORAL ONCE AS NEEDED
Status: DISCONTINUED | OUTPATIENT
Start: 2023-12-18 | End: 2023-12-20 | Stop reason: HOSPADM

## 2023-12-18 RX ORDER — NIFEDIPINE 10 MG/1
10 CAPSULE ORAL ONCE AS NEEDED
Status: DISCONTINUED | OUTPATIENT
Start: 2023-12-18 | End: 2023-12-18

## 2023-12-18 RX ORDER — LABETALOL HYDROCHLORIDE 5 MG/ML
20 INJECTION, SOLUTION INTRAVENOUS ONCE AS NEEDED
Status: DISCONTINUED | OUTPATIENT
Start: 2023-12-18 | End: 2023-12-18

## 2023-12-18 RX ORDER — TRANEXAMIC ACID 100 MG/ML
1000 INJECTION, SOLUTION INTRAVENOUS ONCE AS NEEDED
Status: DISCONTINUED | OUTPATIENT
Start: 2023-12-18 | End: 2023-12-18

## 2023-12-18 RX ORDER — METHYLERGONOVINE MALEATE 0.2 MG/ML
0.2 INJECTION INTRAVENOUS ONCE AS NEEDED
Status: DISCONTINUED | OUTPATIENT
Start: 2023-12-18 | End: 2023-12-20 | Stop reason: HOSPADM

## 2023-12-18 RX ORDER — NIFEDIPINE 10 MG/1
10 CAPSULE ORAL ONCE AS NEEDED
Status: DISCONTINUED | OUTPATIENT
Start: 2023-12-18 | End: 2023-12-20 | Stop reason: HOSPADM

## 2023-12-18 RX ORDER — METHYLERGONOVINE MALEATE 0.2 MG/ML
0.2 INJECTION INTRAVENOUS ONCE AS NEEDED
Status: DISCONTINUED | OUTPATIENT
Start: 2023-12-18 | End: 2023-12-18

## 2023-12-18 RX ORDER — ENOXAPARIN SODIUM 100 MG/ML
40 INJECTION SUBCUTANEOUS EVERY 24 HOURS
Status: DISCONTINUED | OUTPATIENT
Start: 2023-12-18 | End: 2023-12-20 | Stop reason: HOSPADM

## 2023-12-18 RX ORDER — TRANEXAMIC ACID 100 MG/ML
1000 INJECTION, SOLUTION INTRAVENOUS ONCE AS NEEDED
Status: DISCONTINUED | OUTPATIENT
Start: 2023-12-18 | End: 2023-12-20 | Stop reason: HOSPADM

## 2023-12-18 RX ORDER — OXYTOCIN/0.9 % SODIUM CHLORIDE 30/500 ML
60 PLASTIC BAG, INJECTION (ML) INTRAVENOUS ONCE AS NEEDED
Status: DISCONTINUED | OUTPATIENT
Start: 2023-12-18 | End: 2023-12-20 | Stop reason: HOSPADM

## 2023-12-18 RX ORDER — IBUPROFEN 600 MG/1
600 TABLET ORAL EVERY 6 HOURS
Status: DISCONTINUED | OUTPATIENT
Start: 2023-12-19 | End: 2023-12-20 | Stop reason: HOSPADM

## 2023-12-18 RX ORDER — ACETAMINOPHEN 325 MG/1
975 TABLET ORAL EVERY 6 HOURS
Status: DISCONTINUED | OUTPATIENT
Start: 2023-12-18 | End: 2023-12-20 | Stop reason: HOSPADM

## 2023-12-18 RX ORDER — SODIUM CITRATE AND CITRIC ACID MONOHYDRATE 334; 500 MG/5ML; MG/5ML
30 SOLUTION ORAL ONCE
Status: COMPLETED | OUTPATIENT
Start: 2023-12-18 | End: 2023-12-18

## 2023-12-18 RX ORDER — NALBUPHINE HYDROCHLORIDE 10 MG/ML
5 INJECTION, SOLUTION INTRAMUSCULAR; INTRAVENOUS; SUBCUTANEOUS
Status: ACTIVE | OUTPATIENT
Start: 2023-12-18 | End: 2023-12-19

## 2023-12-18 RX ORDER — ACETAMINOPHEN 120 MG/1
SUPPOSITORY RECTAL AS NEEDED
Status: DISCONTINUED | OUTPATIENT
Start: 2023-12-18 | End: 2023-12-18

## 2023-12-18 RX ORDER — METOCLOPRAMIDE 10 MG/1
10 TABLET ORAL EVERY 6 HOURS PRN
Status: DISCONTINUED | OUTPATIENT
Start: 2023-12-18 | End: 2023-12-18

## 2023-12-18 RX ADMIN — ACETAMINOPHEN 975 MG: 325 TABLET ORAL at 18:50

## 2023-12-18 RX ADMIN — OXYTOCIN 600 MILLI-UNITS/MIN: 10 INJECTION, SOLUTION INTRAMUSCULAR; INTRAVENOUS at 10:40

## 2023-12-18 RX ADMIN — PHENYLEPHRINE-NACL IV SOLUTION 10 MG/250ML-0.9% 0.27 MCG/KG/MIN: 10-0.9/25 SOLUTION at 10:28

## 2023-12-18 RX ADMIN — KETOROLAC TROMETHAMINE 30 MG: 30 INJECTION, SOLUTION INTRAMUSCULAR; INTRAVENOUS at 10:59

## 2023-12-18 RX ADMIN — KETOROLAC TROMETHAMINE 30 MG: 30 INJECTION, SOLUTION INTRAMUSCULAR; INTRAVENOUS at 18:50

## 2023-12-18 RX ADMIN — FENTANYL CITRATE 10 MCG: 50 INJECTION, SOLUTION INTRAMUSCULAR; INTRAVENOUS at 10:13

## 2023-12-18 RX ADMIN — ACETAMINOPHEN 650 MG: 120 SUPPOSITORY RECTAL at 11:16

## 2023-12-18 RX ADMIN — BUPIVACAINE HYDROCHLORIDE IN DEXTROSE 1.4 ML: 7.5 INJECTION, SOLUTION SUBARACHNOID at 10:13

## 2023-12-18 RX ADMIN — FAMOTIDINE 20 MG: 10 INJECTION, SOLUTION INTRAVENOUS at 09:41

## 2023-12-18 RX ADMIN — PHENYLEPHRINE-NACL IV SOLUTION 10 MG/250ML-0.9% 0.8 MCG/KG/MIN: 10-0.9/25 SOLUTION at 10:14

## 2023-12-18 RX ADMIN — SODIUM CHLORIDE, POTASSIUM CHLORIDE, SODIUM LACTATE AND CALCIUM CHLORIDE 999 ML/HR: 600; 310; 30; 20 INJECTION, SOLUTION INTRAVENOUS at 09:10

## 2023-12-18 RX ADMIN — METOCLOPRAMIDE 10 MG: 5 INJECTION, SOLUTION INTRAMUSCULAR; INTRAVENOUS at 09:41

## 2023-12-18 RX ADMIN — DEXAMETHASONE SODIUM PHOSPHATE 4 MG: 4 INJECTION, SOLUTION INTRA-ARTICULAR; INTRALESIONAL; INTRAMUSCULAR; INTRAVENOUS; SOFT TISSUE at 10:45

## 2023-12-18 RX ADMIN — SODIUM CHLORIDE, SODIUM LACTATE, POTASSIUM CHLORIDE, AND CALCIUM CHLORIDE: 600; 310; 30; 20 INJECTION, SOLUTION INTRAVENOUS at 10:13

## 2023-12-18 RX ADMIN — CEFAZOLIN 2 G: 330 INJECTION, POWDER, FOR SOLUTION INTRAMUSCULAR; INTRAVENOUS at 10:17

## 2023-12-18 RX ADMIN — ONDANSETRON 4 MG: 2 INJECTION INTRAMUSCULAR; INTRAVENOUS at 09:54

## 2023-12-18 RX ADMIN — ENOXAPARIN SODIUM 40 MG: 40 INJECTION SUBCUTANEOUS at 23:14

## 2023-12-18 RX ADMIN — MORPHINE SULFATE 0.1 MG: 0.5 INJECTION, SOLUTION EPIDURAL; INTRATHECAL; INTRAVENOUS at 10:13

## 2023-12-18 RX ADMIN — KETOROLAC TROMETHAMINE 30 MG: 30 INJECTION, SOLUTION INTRAMUSCULAR; INTRAVENOUS at 23:13

## 2023-12-18 RX ADMIN — SODIUM CITRATE AND CITRIC ACID MONOHYDRATE 30 ML: 500; 334 SOLUTION ORAL at 09:41

## 2023-12-18 RX ADMIN — CEFAZOLIN SODIUM 2 G: 2 INJECTION, SOLUTION INTRAVENOUS at 09:30

## 2023-12-18 SDOH — HEALTH STABILITY: MENTAL HEALTH: CURRENT SMOKER: 0

## 2023-12-18 SDOH — SOCIAL STABILITY: SOCIAL INSECURITY: DOES ANYONE TRY TO KEEP YOU FROM HAVING/CONTACTING OTHER FRIENDS OR DOING THINGS OUTSIDE YOUR HOME?: NO

## 2023-12-18 SDOH — SOCIAL STABILITY: SOCIAL INSECURITY: ABUSE SCREEN: ADULT

## 2023-12-18 SDOH — SOCIAL STABILITY: SOCIAL INSECURITY: HAS ANYONE EVER THREATENED TO HURT YOUR FAMILY OR YOUR PETS?: NO

## 2023-12-18 SDOH — HEALTH STABILITY: MENTAL HEALTH: HAVE YOU USED ANY SUBSTANCES (CANABIS, COCAINE, HEROIN, HALLUCINOGENS, INHALANTS, ETC.) IN THE PAST 12 MONTHS?: NO

## 2023-12-18 SDOH — SOCIAL STABILITY: SOCIAL INSECURITY: ARE THERE ANY APPARENT SIGNS OF INJURIES/BEHAVIORS THAT COULD BE RELATED TO ABUSE/NEGLECT?: NO

## 2023-12-18 SDOH — ECONOMIC STABILITY: HOUSING INSECURITY: DO YOU FEEL UNSAFE GOING BACK TO THE PLACE WHERE YOU ARE LIVING?: NO

## 2023-12-18 SDOH — SOCIAL STABILITY: SOCIAL INSECURITY: PHYSICAL ABUSE: DENIES

## 2023-12-18 SDOH — HEALTH STABILITY: MENTAL HEALTH: WERE YOU ABLE TO COMPLETE ALL THE BEHAVIORAL HEALTH SCREENINGS?: YES

## 2023-12-18 SDOH — SOCIAL STABILITY: SOCIAL INSECURITY: VERBAL ABUSE: DENIES

## 2023-12-18 SDOH — SOCIAL STABILITY: SOCIAL INSECURITY: HAVE YOU HAD THOUGHTS OF HARMING ANYONE ELSE?: NO

## 2023-12-18 SDOH — HEALTH STABILITY: MENTAL HEALTH: HAVE YOU USED ANY PRESCRIPTION DRUGS OTHER THAN PRESCRIBED IN THE PAST 12 MONTHS?: NO

## 2023-12-18 SDOH — SOCIAL STABILITY: SOCIAL INSECURITY: ARE YOU OR HAVE YOU BEEN THREATENED OR ABUSED PHYSICALLY, EMOTIONALLY, OR SEXUALLY BY ANYONE?: NO

## 2023-12-18 SDOH — SOCIAL STABILITY: SOCIAL INSECURITY: DO YOU FEEL ANYONE HAS EXPLOITED OR TAKEN ADVANTAGE OF YOU FINANCIALLY OR OF YOUR PERSONAL PROPERTY?: NO

## 2023-12-18 ASSESSMENT — PAIN SCALES - GENERAL
PAINLEVEL_OUTOF10: 3
PAINLEVEL_OUTOF10: 4
PAIN_LEVEL: 0
PAINLEVEL_OUTOF10: 3
PAINLEVEL_OUTOF10: 3

## 2023-12-18 ASSESSMENT — LIFESTYLE VARIABLES
SKIP TO QUESTIONS 9-10: 1
HOW OFTEN DO YOU HAVE A DRINK CONTAINING ALCOHOL: NEVER
AUDIT-C TOTAL SCORE: 0
HOW MANY STANDARD DRINKS CONTAINING ALCOHOL DO YOU HAVE ON A TYPICAL DAY: PATIENT DOES NOT DRINK
HOW OFTEN DO YOU HAVE 6 OR MORE DRINKS ON ONE OCCASION: NEVER
AUDIT-C TOTAL SCORE: 0

## 2023-12-18 ASSESSMENT — COLUMBIA-SUICIDE SEVERITY RATING SCALE - C-SSRS
1. IN THE PAST MONTH, HAVE YOU WISHED YOU WERE DEAD OR WISHED YOU COULD GO TO SLEEP AND NOT WAKE UP?: NO
6. HAVE YOU EVER DONE ANYTHING, STARTED TO DO ANYTHING, OR PREPARED TO DO ANYTHING TO END YOUR LIFE?: NO
2. HAVE YOU ACTUALLY HAD ANY THOUGHTS OF KILLING YOURSELF?: NO

## 2023-12-18 ASSESSMENT — PAIN DESCRIPTION - DESCRIPTORS
DESCRIPTORS: SORE

## 2023-12-18 ASSESSMENT — PATIENT HEALTH QUESTIONNAIRE - PHQ9
2. FEELING DOWN, DEPRESSED OR HOPELESS: NOT AT ALL
1. LITTLE INTEREST OR PLEASURE IN DOING THINGS: NOT AT ALL
SUM OF ALL RESPONSES TO PHQ9 QUESTIONS 1 & 2: 0

## 2023-12-18 ASSESSMENT — ACTIVITIES OF DAILY LIVING (ADL): LACK_OF_TRANSPORTATION: NO

## 2023-12-18 NOTE — L&D DELIVERY NOTE
"OB Delivery Note  2023  Marilou Salazar [place] \"Mayra\"  23 y.o.   , Low Transverse        Gestational Age: 39w2d  /Para:   Quantitative Blood Loss: Admission to Discharge: 545 mL (2023  8:09 AM - 2023 11:17 AM)    [place] [[id number]]      Labor Events    Sac identifier: Sac 1  Rupture date/time: 2023 1037  Rupture type: Artificial  Fluid color: Clear  Fluid odor: None  Labor type:  Without Labor  Labor allowed to proceed with plans for an attempted vaginal birth?: No  Complications: None       Placenta    Placenta delivery date/time: 2023 1040  Placenta removal: Manual removal  Placenta appearance: Intact  Placenta disposition: pathology       Cord    Vessels: 3 vessels  Complications: None  Delayed cord clamping?: Yes  Cord clamped date/time: 2023 1039  Cord blood disposition: Lab  Gases sent?: No  Stem cell collection (by provider): No       Lacerations    Episiotomy: None  Perineal laceration: None  Other lacerations?: No  Repair suture: None       Anesthesia    Method: Spinal       Operative Delivery    Forceps attempted?: No  Vacuum extractor attempted?: No       Shoulder Dystocia    Shoulder dystocia present?: No       [place] Delivery    Time head delivered: 2023 10:37:00  Birth date/time: 2023 10:38:00  Delivery type: , Low Transverse   categorization: primary   priority: routine  Indications for : Breech  Incision type: low transverse  Complications: None       Resuscitation    Method: Tactile stimulation, Suctioning       Apgars    Living status: Living  Apgar Component Scores:  1 min.:  5 min.:  10 min.:  15 min.:  20 min.:    Skin color:  1  1       Heart rate:  2  2       Reflex irritability:  2  2       Muscle tone:  2  2       Respiratory effort:  2  2       Total:  9  9       Apgars assigned by: OSKAR SALAZAR RN       Delivery Providers    Delivering clinician: Tyesha VASQUEZ" "DO Ju   Provider Role    Malik Higgins RN Delivery Nurse    Danna Angel RN Nursery Nurse    John Paul Sanchez Surgical Assistant    Shazia Meeks,  Surgical Assistant    Germania Mcconnell RN Registered Nurse             OB  Delivery Note    2023  Marilou Salazar [place] \"Mayra\"  23 y.o.    Review the Delivery Report for details.     Gestational Age: 39w2d  /Para:   Labor Complications: None   Estimated Blood Loss:   Delivery Blood Loss  23 1031 - 23 1121      Total Blood Loss - Surgical Delivery (mL) Anesthesia 545 mL    Total  545 mL          Quantitative Blood Loss:    Delivery Type: , Low Transverse   ROM to Delivery Time: 0h 01m   Sex: Female   Weight: 2980 g    1 Minute 5 Minute 10 Minute   Apgar Totals: 9   9          Lexy Kaufman [[id number]]      Delivery Providers    Delivering clinician: Tyesha Dodd DO   Provider Role    Malik Higgins RN Delivery Nurse    Danna Angel RN Nurse Nurse    John Paul Sanchez Surgical Assistant    Shazia Meeks,  Surgical Assistant    Germania Mcconnell RN Registered Nurse                Details    Pre-Op Diagnosis: 1. Intrauterine pregnancy at 39w2d  2. Breech presentation  3. Velamentous cord insertion  4. Circumvallate placenta   Post-Op Diagnosis: 1. same   Indications:  Breech    Procedure: primary  , Low Transverse  via low transverse  incision   Anesthesia: Spinal    Findings: Female infant delivered, weighing 2980 g . Normal uterus, tubes, and ovaries.   Informed Consent:  The risks, benefits, complications, and alternatives were discussed with the patient. The patient understood that the risks of  section include, but are not limited to: injury to nearby structures or organs, infection, blood loss and possible need for transfusion, and potential need for more surgery including hysterectomy. The patient stated understanding and desired to proceed. " "All questions were answered. The site of surgery was properly noted and marked. The patient was identified as Marilou Kaufman \"Mayra\" and the procedure verified as a  delivery. A Time Out was held and the above information confirmed.    Procedure Details:  The patient was taken to the operating room where Spinal  anesthesia was found to be adequate. Antibiotics were given for infection prophylaxis. She was prepped and draped in the normal sterile fashion in the dorsal supine position with leftward tilt. A Pfannenstiel skin incision was made with scalpel. The incision was carried down to the fascia with bovie cauterization. The fascia was incised and extended laterally with Reese scissors. The inferior aspect of the fascia was grasped with Kocher clamps and dissected bluntly and using cautery.. In a similar fashion, the superior aspect of the fascia was elevated with Kocher clamps, and the rectus muscle was dissected off. The rectus muscles were  bluntly down the midline down to the level of the pubic symphysis. The peritoneum was identified and entered bluntly. Peritoneal incision was extended superiorly and inferiorly with good visualization of the bladder.    The bladder blade was inserted and the lower uterine segment was then incised with a low transverse  incision and was extended bluntly. The amniotic sac was ruptured and Clear  color noted. The bladder blade was removed and the feet of the infant were grasped and the baby delivered from the breech presentation atraumatically using the usual maneuvers. The cord clamped and cut, and the baby was handed off to the awaiting clinicians.     The placenta was removed via manual extraction. The uterus was then exteriorized and cleared of all clots and debris. The hysterotomy was repaired with suture of 0 chromic in a running locked fashion. A second imbricating layer of the same suture was placed and good hemostasis observed. The ovaries " and tubes appeared normal bilaterally. The uterus, tubes, and ovaries were then returned to the abdomen and pelvis. The uterine incision was reinspected and found to be hemostatic. The subfascial spaces were inspected and noted to be hemostatic. The fascia was then reapproximated with 0 stratafix. The skin was closed with 4-0 Vicryl sutures.    The patient tolerated the procedure well. Sponge, instrument, and needle counts were correct and the patient was taken to the recovery room in good and stable condition.      Tyesha Dodd, DO

## 2023-12-18 NOTE — ANESTHESIA PROCEDURE NOTES
Spinal Block    Patient location during procedure: OB  Start time: 12/18/2023 10:03 AM  End time: 12/18/2023 10:13 AM  Reason for block: primary anesthetic  Staffing  Performed: CRNA and SRNA   Authorized by: FREDI Jenkins    Performed by: FREDI Jenkins    Preanesthetic Checklist  Completed: patient identified, IV checked, risks and benefits discussed, surgical consent, pre-op evaluation, timeout performed and sterile techniques followed  Block Timeout  RN/Licensed healthcare professional reads aloud to the Anesthesia provider and entire team: Patient identity, procedure with side and site, patient position, and as applicable the availability of implants/special equipment/special requirements.  Patient on coagulant treatment: no  Timeout performed at: 12/18/2023 10:03 AM  Spinal Block  Patient position: sitting  Prep: ChloraPrep  Sterility prep: gloves, gown, hand hygiene, mask and drape  Sedation level: no sedation  Patient monitoring: blood pressure, continuous pulse oximetry and EKG  Approach: midline  Vertebral space: L3-4  Injection technique: single-shot  Needle  Needle type: pencil-point   Needle gauge: 25 G  Needle length: 3.5 in  Free flowing CSF: yes    Assessment  Sensory level: T4 bilateral  Block outcome: Allis test negative  Procedure assessment: patient tolerated procedure well with no immediate complications  Additional Notes  Pt tolerated procedure well, positioned in EVA after spinal. Phenylephrine gtt started. Monitors applied. Level checked. See chart.

## 2023-12-18 NOTE — ANESTHESIA PREPROCEDURE EVALUATION
"Patient: Marilou Kaufman \"Mayra\"    Evaluation Method: In-person visit    Procedure Information       Date/Time: 23 1000    Procedure:  Section    Location: [facility] OB 02 / [facility] OB    Surgeons: Orlando Gandara MD            Relevant Problems   GI   (+) Gastroesophageal reflux disease without esophagitis      Musculoskeletal   (+) Scoliosis       Clinical information reviewed:   Tobacco  Allergies  Meds   Med Hx  Surg Hx   Fam Hx  Soc Hx        NPO Detail:  NPO/Void Status  Date of Last Liquid: 23  Time of Last Liquid: 300  Date of Last Solid: 23  Time of Last Solid:   Last Intake Type: Clear fluids  Time of Last Void: 815         OB/GYN     Physical Exam    Airway  Mallampati: II  TM distance: <3 FB  Neck ROM: full     Cardiovascular - normal exam     Dental    Pulmonary - normal exam     Abdominal            Anesthesia Plan    ASA 2     spinal   (I informed and discussed the risks and benefits of general, spinal and epidural anesthesia with the patient.  The patient expressed her understanding and her questions were answered.  A verbal consent was given by the patient.    )  The patient is not a current smoker.  Patient was not previously instructed to abstain from smoking on day of procedure.  Patient did not smoke on day of procedure.    Anesthetic plan and risks discussed with patient.  Use of blood products discussed with patient who consented to blood products.    Plan discussed with CRNA.        "

## 2023-12-18 NOTE — ANESTHESIA POSTPROCEDURE EVALUATION
"Patient: Marilou Kaufman \"Mayra\"    Procedure Summary       Date: 23 Room / Location: [facility] OB 02 / [facility] OB    Anesthesia Start: 954 Anesthesia Stop:     Procedure:  Section Diagnosis: (c/s)    Surgeons: Tyesha Dodd DO Responsible Provider: FREDI Jenkins    Anesthesia Type: spinal ASA Status: 2            Anesthesia Type: spinal    Vitals Value Taken Time   /60 23 1130   Temp 35.3 °C (95.5 °F) 23 1130   Pulse 86 23 1130   Resp 20 23 1131   SpO2 99 23 1131       Anesthesia Post Evaluation    Patient location during evaluation: bedside  Patient participation: complete - patient participated  Level of consciousness: awake and alert  Pain score: 0  Pain management: adequate  Multimodal analgesia pain management approach  Airway patency: patent  Cardiovascular status: acceptable  Respiratory status: acceptable  Hydration status: acceptable  Postoperative Nausea and Vomiting: none  Comments: Epidural catheter removed by nursing. No redness, swelling, or drainage at puncture site.    Complete resolution of numbness. Patient is able to lift legs, bend at the knees, and ambulate.    Patient denies problems with urination.    Patient denies nausea, headache or severe back pain.         No notable events documented.    "

## 2023-12-18 NOTE — H&P
Obstetrical Admission History and Physical     Marilou Kaufman is a 23 y.o.  at 39w2d. CATHIE: 2023, by Ultrasound. Estimated fetal weight: 2765g. She has had prenatal care with Dr. Guadarrama .    Chief Complaint: Scheduled primary C/S for breech    Assessment/Plan    IUP at 39.2  Breech presentation  Velamentous cord insertion, circumvallate placenta    Plan primary C/S    Principal Problem:    Pregnancy with 39 completed weeks gestation      Pregnancy Problems (from 23 to present)       Problem Noted Resolved    Pregnancy with 39 completed weeks gestation 2023 by Tyesha Dodd DO No    Priority:  Medium      Breech presentation of fetus 2023 by Eugenia Guadarrama MD No    Priority:  Medium      Overview Addendum 2023  7:39 AM by Eugenia Guadarrama MD     Breech on 36 week usn.  Posterior placenta, BHASKAR 18. She desires CS for delivery. CS  at 10:00.         Velamentous insertion of umbilical cord in third trimester 10/15/2023 by Eugenia Guadarrama MD No    Priority:  Medium      Overview Addendum 2023  1:35 PM by Eugenia Guadarrama MD     Circumvallate placenta and velamentous cord insertion are confirmed at 30 week MFM imaging.   EFW at 36 weeks 2765g, 36% and breech presentation.         38 weeks gestation of pregnancy 10/15/2023 by uEgenia Guadarrama MD No    Priority:  Medium      Overview Addendum 10/31/2023  4:27 PM by Eugenia Guadarrama MD     NIPS negative. Vegan diet. Adequate protein intake is encouraged            Encounter for supervision of normal first pregnancy in third trimester 2023 by Rosalva Walters RN No    Priority:  Medium      Overview Signed 10/15/2023  4:42 PM by Eugenia Guadarrama MD     NIPS negative.   Vegan diet and adequate protein intake is encouraged.                Options for delivery have been discussed with the patient and she elects for a primary  section.    The surgery has been discussed in detail. The  risks, benefits, complications, alternatives, expected outcomes, potential problems during recuperation and recovery, and the risks of not performing the procedure were discussed with the patient. The patient stated understanding that the risks of a  section include, but are not limited to: death; reaction to medications; injury to bowel, bladder, ureters, uterus, cervix, vagina, and other pelvic and abdominal structures, infection; blood loss and possible need for transfusion; and potential need for more surgery, including hysterectomy. The risks of injury to the infant during  section were also discussed. The possible need for a  section in the future was explained. All questions were answered. There was concurrence with the planned procedure, and the patient wanted to proceed.    Admit to inpatient status. I anticipate that this patient will require a stay exceeding at least 2 midnights for delivery and postpartum.  Proceed with planned primary  section.  Management of pregnancy complications, as indicated.     Subjective   Good fetal movement. Denies vaginal bleeding., Denies contractions., Denies leaking of fluid.       Indication for Primary  Section  malpresentation: breech         Obstetrical History   OB History    Para Term  AB Living   1             SAB IAB Ectopic Multiple Live Births                  # Outcome Date GA Lbr Ankur/2nd Weight Sex Delivery Anes PTL Lv   1 Current               Obstetric Comments   28 wk 5 day,thinks nshe may have hemorrhoids, wants T-dap, she has not yet picked a pediatrician.       Past Medical History  Past Medical History:   Diagnosis Date    Bleeding after intercourse 2023    Breakthrough bleeding on birth control pills 2023    Dysmenorrhea 2023    Dysuria 2023    Irregular menses 2023    Personal history of other diseases of the musculoskeletal system and connective tissue 2021     History of low back pain    Personal history of other diseases of the musculoskeletal system and connective tissue 08/13/2021    History of low back pain    Personal history of other diseases of the respiratory system     History of asthma    Scoliosis         Past Surgical History   Past Surgical History:   Procedure Laterality Date    OTHER SURGICAL HISTORY  11/07/2018    Dermatological surgery       Social History  Social History     Tobacco Use    Smoking status: Never    Smokeless tobacco: Never   Substance Use Topics    Alcohol use: Not Currently     Substance and Sexual Activity   Drug Use Never       Allergies  Patient has no known allergies.     Medications  Medications Prior to Admission   Medication Sig Dispense Refill Last Dose    famotidine (Pepcid) 20 mg tablet Take 1 tablet (20 mg) by mouth every 12 hours.   12/17/2023    Prenatal 19 29 mg iron- 1 mg tablet,chewable Chew 1 tablet once daily. CHEW AND SWALLOW   12/17/2023       Objective    Last Vitals  Temp Pulse Resp BP MAP O2 Sat   36.7 °C (98.1 °F) 82 14 122/79   (!) 93 %     Physical Examination  GENERAL: Examination reveals a well developed, well nourished, gravid female in no acute distress. She is alert and cooperative.  HEENT: PERRLA. Nose normal, no erythema or discharge. Mouth and throat clear.  LUNGS: clear to auscultation bilaterally  HEART: regular rate and rhythm, S1, S2 normal, no murmur, click, rub or gallop  FHR is  , with  , and a   tracing.    Folcroft reading:    The fetus is in a breech    presentation, determined by Leopold's maneuver  EXTREMITIES: no redness or tenderness in the calves or thighs, no edema  NEUROLOGICAL: alert, oriented, normal speech, no focal findings or movement disorder noted  PSYCHOLOGICAL: awake and alert; oriented to person, place, and time    Lab Review  Labs in chart were reviewed.

## 2023-12-19 LAB
ERYTHROCYTE [DISTWIDTH] IN BLOOD BY AUTOMATED COUNT: 12.8 % (ref 11.5–14.5)
HCT VFR BLD AUTO: 28.4 % (ref 36–46)
HGB BLD-MCNC: 9.2 G/DL (ref 12–16)
MCH RBC QN AUTO: 28.8 PG (ref 26–34)
MCHC RBC AUTO-ENTMCNC: 32.4 G/DL (ref 32–36)
MCV RBC AUTO: 89 FL (ref 80–100)
NRBC BLD-RTO: 0 /100 WBCS (ref 0–0)
PLATELET # BLD AUTO: 237 X10*3/UL (ref 150–450)
RBC # BLD AUTO: 3.19 X10*6/UL (ref 4–5.2)
WBC # BLD AUTO: 13.8 X10*3/UL (ref 4.4–11.3)

## 2023-12-19 PROCEDURE — 36415 COLL VENOUS BLD VENIPUNCTURE: CPT | Performed by: OBSTETRICS & GYNECOLOGY

## 2023-12-19 PROCEDURE — 2500000004 HC RX 250 GENERAL PHARMACY W/ HCPCS (ALT 636 FOR OP/ED): Performed by: OBSTETRICS & GYNECOLOGY

## 2023-12-19 PROCEDURE — 1100000001 HC PRIVATE ROOM DAILY

## 2023-12-19 PROCEDURE — 85027 COMPLETE CBC AUTOMATED: CPT | Performed by: OBSTETRICS & GYNECOLOGY

## 2023-12-19 PROCEDURE — 2500000001 HC RX 250 WO HCPCS SELF ADMINISTERED DRUGS (ALT 637 FOR MEDICARE OP): Performed by: OBSTETRICS & GYNECOLOGY

## 2023-12-19 PROCEDURE — 96372 THER/PROPH/DIAG INJ SC/IM: CPT | Performed by: OBSTETRICS & GYNECOLOGY

## 2023-12-19 RX ORDER — FERROUS SULFATE 325(65) MG
65 TABLET ORAL
Status: DISCONTINUED | OUTPATIENT
Start: 2023-12-19 | End: 2023-12-20 | Stop reason: HOSPADM

## 2023-12-19 RX ADMIN — IBUPROFEN 600 MG: 600 TABLET, FILM COATED ORAL at 11:34

## 2023-12-19 RX ADMIN — KETOROLAC TROMETHAMINE 30 MG: 30 INJECTION, SOLUTION INTRAMUSCULAR; INTRAVENOUS at 05:02

## 2023-12-19 RX ADMIN — ACETAMINOPHEN 975 MG: 325 TABLET ORAL at 02:16

## 2023-12-19 RX ADMIN — FAMOTIDINE 20 MG: 20 TABLET, FILM COATED ORAL at 02:16

## 2023-12-19 RX ADMIN — ACETAMINOPHEN 975 MG: 325 TABLET ORAL at 19:57

## 2023-12-19 RX ADMIN — ENOXAPARIN SODIUM 40 MG: 40 INJECTION SUBCUTANEOUS at 23:47

## 2023-12-19 RX ADMIN — FERROUS SULFATE TAB 325 MG (65 MG ELEMENTAL FE) 1 TABLET: 325 (65 FE) TAB at 08:33

## 2023-12-19 RX ADMIN — IBUPROFEN 600 MG: 600 TABLET, FILM COATED ORAL at 23:46

## 2023-12-19 RX ADMIN — IBUPROFEN 600 MG: 600 TABLET, FILM COATED ORAL at 17:24

## 2023-12-19 RX ADMIN — ACETAMINOPHEN 975 MG: 325 TABLET ORAL at 07:54

## 2023-12-19 RX ADMIN — ACETAMINOPHEN 975 MG: 325 TABLET ORAL at 14:09

## 2023-12-19 RX ADMIN — FAMOTIDINE 20 MG: 20 TABLET, FILM COATED ORAL at 14:09

## 2023-12-19 ASSESSMENT — PAIN SCALES - GENERAL
PAINLEVEL_OUTOF10: 0 - NO PAIN
PAINLEVEL_OUTOF10: 3
PAINLEVEL_OUTOF10: 2
PAINLEVEL_OUTOF10: 3
PAINLEVEL_OUTOF10: 1
PAINLEVEL_OUTOF10: 2
PAINLEVEL_OUTOF10: 3
PAINLEVEL_OUTOF10: 4

## 2023-12-19 ASSESSMENT — PAIN DESCRIPTION - ORIENTATION
ORIENTATION: LOWER
ORIENTATION: MID;LOWER

## 2023-12-19 ASSESSMENT — PAIN DESCRIPTION - LOCATION
LOCATION: INCISION
LOCATION: INCISION

## 2023-12-19 ASSESSMENT — PAIN - FUNCTIONAL ASSESSMENT
PAIN_FUNCTIONAL_ASSESSMENT: 0-10
PAIN_FUNCTIONAL_ASSESSMENT: 0-10

## 2023-12-19 ASSESSMENT — PAIN DESCRIPTION - DESCRIPTORS: DESCRIPTORS: CRAMPING

## 2023-12-19 NOTE — LACTATION NOTE
Lactation Consultant Note  Lactation Consultation  Reason for Consult: Initial assessment  Consultant Name: Helen FRANCIS    Maternal Information  Has mother  before?: No  Infant to breast within first 2 hours of birth?: Yes  Exclusive Pump and Bottle Feed: No    Maternal Assessment  Breast Assessment: Large, Pendulous, Symmetrical  Nipple Assessment: Intact, Erect  Areola Assessment: Normal    Infant Assessment  Infant Behavior: Sleepy  Infant Assessment: Good cupping of tongue    Feeding Assessment  Nutrition Source: Breastmilk  Feeding Method: Nursing at the breast  Feeding Position: Cross - cradle  Suck/Feeding: Sustained, Tactile stimulation needed  Latch Assessment: Deep latch obtained, Comfortable with no pain, Flanged lips, Eagerly grasped on to latch    LATCH TOOL  Latch: Grasps breast, tongue down, lips flanged, rhythmic sucking  Audible Swallowing: A few with stimulation  Type of Nipple: Everted (After stimulation)  Comfort (Breast/Nipple): Soft/non-tender  Hold (Positioning): Minimal assist, teach one side, mother does other, staff holds  LATCH Score: 8    Breast Pump       Other OB Lactation Tools       Patient Follow-up  Inpatient Lactation Follow-up Needed : Yes  Outpatient Lactation Follow-up: Recommended    Other OB Lactation Documentation       Recommendations/Summary  Assisted with latch. Baby had recenly fed. I demonstrated different positioning and hand placement. Baby seems to latch well. Mom reports a comfortable latch. Mom is easily expressible. Reviewed with patient milk supply patterns and  feeding patterns in the fist and second 24 HOL. Mom was asked to attempt/feed baby at least every 2-3 hours or on demand with a goal of 8-12 feeds daily. Feedign cues reviewed with mom. Breastfeeding education reviewed and questions answered. Mom aware of lactation support and asked to call out for feed assistance and with questions as needed.

## 2023-12-19 NOTE — CARE PLAN
The patient's goals for the shift include breast feeding    The clinical goals for the shift include pain management    Over the shift, pt VSS, assessment WNL, pt is progressing towards discharge goals, bonding with , adjusting to postpartum life, continues to work on breast feeding.

## 2023-12-19 NOTE — PROGRESS NOTES
Social Work Brief Note     Patient: Marilou Kaufman   Name: Laura   : 23      Reason for Visit: Support      met with Ms. Kaufman bedside to introduce self and provide any support needed. She states that she is feeling well following the birth of her first child by  a girl named Laura on 23. Reviewed PPD and Safe Sleep. Information provided in folder. SW offered Help me Grow program but she declines. She said her and her  have a good support system at home. She had no additional questions or concerns at this time.       Signature: ARMANDO Talavera

## 2023-12-20 ENCOUNTER — PHARMACY VISIT (OUTPATIENT)
Dept: PHARMACY | Facility: CLINIC | Age: 23
End: 2023-12-20
Payer: COMMERCIAL

## 2023-12-20 VITALS
RESPIRATION RATE: 16 BRPM | WEIGHT: 138.45 LBS | TEMPERATURE: 98.2 F | OXYGEN SATURATION: 99 % | HEART RATE: 78 BPM | SYSTOLIC BLOOD PRESSURE: 116 MMHG | BODY MASS INDEX: 26.14 KG/M2 | HEIGHT: 61 IN | DIASTOLIC BLOOD PRESSURE: 74 MMHG

## 2023-12-20 PROBLEM — O43.123 VELAMENTOUS INSERTION OF UMBILICAL CORD IN THIRD TRIMESTER (HHS-HCC): Status: RESOLVED | Noted: 2023-10-15 | Resolved: 2023-12-20

## 2023-12-20 PROBLEM — Z34.03 ENCOUNTER FOR SUPERVISION OF NORMAL FIRST PREGNANCY IN THIRD TRIMESTER (HHS-HCC): Status: RESOLVED | Noted: 2023-09-07 | Resolved: 2023-12-20

## 2023-12-20 PROBLEM — O43.119: Status: RESOLVED | Noted: 2023-09-07 | Resolved: 2023-12-20

## 2023-12-20 PROBLEM — Z34.00 PRIMIGRAVIDA (HHS-HCC): Status: RESOLVED | Noted: 2023-12-20 | Resolved: 2023-12-20

## 2023-12-20 PROBLEM — Z3A.39 PREGNANCY WITH 39 COMPLETED WEEKS GESTATION (HHS-HCC): Status: RESOLVED | Noted: 2023-12-18 | Resolved: 2023-12-20

## 2023-12-20 PROBLEM — Z3A.38 38 WEEKS GESTATION OF PREGNANCY (HHS-HCC): Status: RESOLVED | Noted: 2023-10-15 | Resolved: 2023-12-20

## 2023-12-20 PROBLEM — Z34.00 PRIMIGRAVIDA (HHS-HCC): Status: ACTIVE | Noted: 2023-12-20

## 2023-12-20 PROCEDURE — 2500000004 HC RX 250 GENERAL PHARMACY W/ HCPCS (ALT 636 FOR OP/ED): Performed by: OBSTETRICS & GYNECOLOGY

## 2023-12-20 PROCEDURE — 90707 MMR VACCINE SC: CPT | Performed by: OBSTETRICS & GYNECOLOGY

## 2023-12-20 PROCEDURE — 90471 IMMUNIZATION ADMIN: CPT | Performed by: OBSTETRICS & GYNECOLOGY

## 2023-12-20 PROCEDURE — 2500000001 HC RX 250 WO HCPCS SELF ADMINISTERED DRUGS (ALT 637 FOR MEDICARE OP): Performed by: OBSTETRICS & GYNECOLOGY

## 2023-12-20 PROCEDURE — RXMED WILLOW AMBULATORY MEDICATION CHARGE

## 2023-12-20 RX ORDER — ACETAMINOPHEN 500 MG
1000 TABLET ORAL EVERY 6 HOURS PRN
Qty: 120 TABLET | Refills: 0 | Status: SHIPPED | OUTPATIENT
Start: 2023-12-20 | End: 2024-03-22 | Stop reason: ALTCHOICE

## 2023-12-20 RX ORDER — IBUPROFEN 600 MG/1
600 TABLET ORAL EVERY 6 HOURS PRN
Qty: 120 TABLET | Refills: 0 | Status: SHIPPED | OUTPATIENT
Start: 2023-12-20 | End: 2024-03-22 | Stop reason: ALTCHOICE

## 2023-12-20 RX ORDER — ACETAMINOPHEN 325 MG/1
975 TABLET ORAL EVERY 6 HOURS PRN
Qty: 60 TABLET | Refills: 0 | Status: SHIPPED | OUTPATIENT
Start: 2023-12-20 | End: 2024-01-19

## 2023-12-20 RX ORDER — IBUPROFEN 600 MG/1
600 TABLET ORAL EVERY 6 HOURS
Qty: 360 TABLET | Refills: 0 | Status: SHIPPED | OUTPATIENT
Start: 2023-12-20 | End: 2024-03-22 | Stop reason: ALTCHOICE

## 2023-12-20 RX ADMIN — IBUPROFEN 600 MG: 600 TABLET, FILM COATED ORAL at 04:30

## 2023-12-20 RX ADMIN — ACETAMINOPHEN 975 MG: 325 TABLET ORAL at 11:01

## 2023-12-20 RX ADMIN — MEASLES, MUMPS, AND RUBELLA VIRUS VACCINE LIVE 0.5 ML: 1000; 12500; 1000 INJECTION, POWDER, LYOPHILIZED, FOR SUSPENSION SUBCUTANEOUS at 09:54

## 2023-12-20 RX ADMIN — ACETAMINOPHEN 975 MG: 325 TABLET ORAL at 04:00

## 2023-12-20 RX ADMIN — IBUPROFEN 600 MG: 600 TABLET, FILM COATED ORAL at 11:01

## 2023-12-20 RX ADMIN — FERROUS SULFATE TAB 325 MG (65 MG ELEMENTAL FE) 1 TABLET: 325 (65 FE) TAB at 08:33

## 2023-12-20 ASSESSMENT — PAIN - FUNCTIONAL ASSESSMENT: PAIN_FUNCTIONAL_ASSESSMENT: 0-10

## 2023-12-20 ASSESSMENT — PAIN SCALES - GENERAL
PAINLEVEL_OUTOF10: 4
PAINLEVEL_OUTOF10: 2
PAINLEVEL_OUTOF10: 1

## 2023-12-20 ASSESSMENT — PAIN DESCRIPTION - LOCATION: LOCATION: ABDOMEN

## 2023-12-20 ASSESSMENT — PAIN DESCRIPTION - DESCRIPTORS: DESCRIPTORS: SORE

## 2023-12-20 ASSESSMENT — ACTIVITIES OF DAILY LIVING (ADL): LACK_OF_TRANSPORTATION: NO

## 2023-12-20 NOTE — CARE PLAN
The patient's goals for the shift include infant care/bonding/discharge home    The clinical goals for the shift include free from injry      Problem: Postpartum  Goal: Experiences normal postpartum course  Outcome: Met  Goal: Appropriate maternal -  bonding  Outcome: Met  Goal: Establish and maintain infant feeding pattern for adequate nutrition  Outcome: Met  Goal: Incisions, wounds, or drain sites healing without S/S of infection  Outcome: Met  Goal: No s/sx infection  Outcome: Met  Goal: No s/sx of hemorrhage  Outcome: Met  Goal: Minimal s/sx of HDP and BP<160/110  Outcome: Met     Problem:  Recovery Care  Goal: Verbalizes understanding of post-op instructions  Outcome: Met  Goal: Manages discomfort  Outcome: Met  Goal: Dressing intact until removed with any drainage marked  Outcome: Met  Goal: Patient vital signs are stable  Outcome: Met  Goal: Urine output is 0.5 mL/kg/hr or more  Outcome: Met  Goal: Fundus firm at midline  Outcome: Met     Problem: Safety - Adult  Goal: Free from fall injury  Outcome: Met     Problem: Discharge Planning  Goal: Discharge to home or other facility with appropriate resources  Outcome: Met

## 2023-12-20 NOTE — DISCHARGE SUMMARY
Discharge Summary    Admission Date: 2023  Discharge Date: 23      Discharge Diagnosis  Pregnancy with 39 completed weeks gestation   section delivery    Hospital Course  Delivery Date: 2023  10:38 AM   Delivery type: , Low Transverse    GA at delivery: 39w2d  Outcome: Living   Anesthesia during delivery: Spinal   Intrapartum complications: None   Feeding method: Breastfeeding Status: Yes     Procedures: none  Contraception at discharge: none      Pertinent Physical Exam At Time of Discharge    General: Examination reveals a well developed, well nourished, female, in no acute distress. She is alert and cooperative.  HEENT: External ears normal. Nose normal, no erythema or discharge. Mouth and throat clear.  Breasts: lactating, no erythema or tenderness, nipples normal.  Incision: no drainage, no erythema, Postop dressing intact.  Fundus: firm and at umbilicus.  Extremities: no redness or tenderness in the calves or thighs, no edema.  Psychological: awake and alert; oriented to person, place, and time.    Discharge Meds     Your medication list        START taking these medications        Instructions Last Dose Given Next Dose Due   acetaminophen 325 mg tablet  Commonly known as: TylenoL      Take 3 tablets (975 mg) by mouth every 6 hours if needed for mild pain (1 - 3).       ibuprofen 600 mg tablet      Take 1 tablet (600 mg) by mouth every 6 hours.              CONTINUE taking these medications        Instructions Last Dose Given Next Dose Due   famotidine 20 mg tablet  Commonly known as: Pepcid           Prenatal 19 29 mg iron- 1 mg tablet,chewable  Generic drug: prenatal no115-iron-folic acid                     Where to Get Your Medications        These medications were sent to [facility]  [address] , [address], [place] [address]      Hours: 8 AM to 6 PM Mon-Fri, 9 AM to 1 PM Saturday Phone: [phone]   acetaminophen 325 mg tablet  ibuprofen 600 mg  tablet          Complications Requiring Follow-Up   section incision check    Test Results Pending At Discharge  Pending Labs       Order Current Status    Surgical Pathology Exam - PLACENTA In process            Outpatient Follow-Up  Future Appointments   Date Time Provider Department Center   1/3/2024 11:20 AM Eugenia Guadarrama MD [id number]         OLGA [facility]JEET[facility]

## 2023-12-20 NOTE — DISCHARGE INSTR - OTHER ORDERS

## 2023-12-20 NOTE — DISCHARGE INSTRUCTIONS

## 2023-12-21 ENCOUNTER — APPOINTMENT (OUTPATIENT)
Dept: OBSTETRICS AND GYNECOLOGY | Facility: CLINIC | Age: 23
End: 2023-12-21
Payer: COMMERCIAL

## 2023-12-21 LAB
LABORATORY COMMENT REPORT: NORMAL
PATH REPORT.FINAL DX SPEC: NORMAL
PATH REPORT.GROSS SPEC: NORMAL
PATH REPORT.RELEVANT HX SPEC: NORMAL
PATH REPORT.TOTAL CANCER: NORMAL

## 2023-12-22 NOTE — ASSESSMENT & PLAN NOTE
Patient is doing well postpartum. She was advised to slowly increase activity. Pelvic rest is recommended until 6 weeks postpartum. She was advised to continue prenatal vitamins and to assure adequate hydration.   Contraception was discussed. She is undecided.  She was advised to call the office with any concerning symptom, worsening of pain or bleeding, concern for postpartum depression or anxiety.   Follow up is planned in 4 weeks.

## 2024-01-03 ENCOUNTER — POSTPARTUM VISIT (OUTPATIENT)
Dept: OBSTETRICS AND GYNECOLOGY | Facility: CLINIC | Age: 24
End: 2024-01-03
Payer: COMMERCIAL

## 2024-01-03 VITALS
SYSTOLIC BLOOD PRESSURE: 110 MMHG | DIASTOLIC BLOOD PRESSURE: 62 MMHG | WEIGHT: 120 LBS | BODY MASS INDEX: 22.66 KG/M2 | HEIGHT: 61 IN

## 2024-01-03 DIAGNOSIS — S30.1XXA HEMATOMA OF ABDOMINAL WALL, INITIAL ENCOUNTER: ICD-10-CM

## 2024-01-03 PROCEDURE — 99213 OFFICE O/P EST LOW 20 MIN: CPT | Performed by: OBSTETRICS & GYNECOLOGY

## 2024-01-03 NOTE — PROGRESS NOTES
Postpartum Progress Note    Assessment/Plan   Marilou Kaufman is a 23 y.o.,  s/p CS on 2023 for breech presentation. Hematoma is noted superior to the left incision edge without signs of infection.        Problem List       No episode was linked to this visit.          Hospital course: no complications       Subjective   She notes swelling and a lump on the left side of the incision. This is tender but without drainage. She is not sure how long it has been present.      She reports no breast or nursing problems and she has noted some issues with rapid milk letdown and baby choking. We reviewed positions to try to help prevent this.  She denies emotional concerns today   Her plan for contraception is undecided           Allergies:   Patient has no known allergies.         Physical Exam:  Physical Exam  Constitutional:       Appearance: Normal appearance.   HENT:      Head: Normocephalic and atraumatic.   Pulmonary:      Effort: Pulmonary effort is normal.   Abdominal:      Palpations: Abdomen is soft.      Comments: Incision is intact with no erythema or induration or drainage. There is a 3 cm hematoma palpated superior to the left side of the incision.   Neurological:      General: No focal deficit present.      Mental Status: She is alert and oriented to person, place, and time.   Skin:     General: Skin is warm and dry.      Findings: No rash.   Psychiatric:         Mood and Affect: Mood normal.          Problem List Items Addressed This Visit       Routine postpartum follow-up - Primary    Overview     She delivered a girl by primary CS for breech on 2023. Velamentous cord insertion was also noted. Delivery and postpartum course were uncomplicated.         Current Assessment & Plan     Patient is doing well postpartum. She was advised to slowly increase activity. Pelvic rest is recommended until 6 weeks postpartum. She was advised to continue prenatal vitamins and to assure adequate  hydration.   Contraception was discussed. She is undecided.  She was advised to call the office with any concerning symptom, worsening of pain or bleeding, concern for postpartum depression or anxiety.   Follow up is planned in 4 weeks.          Hematoma of abdominal wall, initial encounter    Overview     3 cm likely hematoma is noted superior to left incision edge. No fluctuance is noted, ecchymosis is noted.         Current Assessment & Plan     She was advised to apply heat and to watch for signs of infection. She was counseled that this will either resolve or drain spontaneously. She will call the office with any concerns.

## 2024-01-03 NOTE — ASSESSMENT & PLAN NOTE
She was advised to apply heat and to watch for signs of infection. She was counseled that this will either resolve or drain spontaneously. She will call the office with any concerns.

## 2024-01-29 NOTE — PROGRESS NOTES
Postpartum Progress Note    Assessment/Plan   Marilou Kaufman is a 23 y.o.,  s/p CS on 2023 for breech presentation. At last visit she had a hematoma to the left of and superior to the incision.  She noted a pulsing pain in left groin yesterday. This lasted several hours, might have been related to standing, not related to bowels or bladder. She denies any fall or other change that could explain the pain.         Problem List       No episode was linked to this visit.          Hospital course: no complications       Subjective         She reports no breast or nursing problems  She denies emotional concerns today   Her plan for contraception is condoms           Allergies:   Patient has no known allergies.         Physical Exam:  Physical Exam  Constitutional:       Appearance: Normal appearance.   Genitourinary:      Bladder, rectum and urethral meatus normal.      Genitourinary Comments: No tenderness in groin or levator muscles.       Right Labia: No rash or lesions.     Left Labia: No lesions or rash.     No vaginal discharge.      No vaginal prolapse present.     No vaginal atrophy present.       Right Adnexa: not tender and no mass present.     Left Adnexa: not tender and no mass present.     No cervical discharge or lesion.      Uterus is not enlarged or tender.      Pelvic exam was performed with patient in the lithotomy position.   HENT:      Head: Normocephalic and atraumatic.      Nose: Nose normal.   Cardiovascular:      Rate and Rhythm: Normal rate and regular rhythm.      Heart sounds: Normal heart sounds.   Pulmonary:      Effort: Pulmonary effort is normal.      Breath sounds: Normal breath sounds.   Abdominal:      Palpations: Abdomen is soft.      Comments: Incision is intact with slight fullness under left edge at site of prior hematoma.   Musculoskeletal:      Cervical back: Neck supple.   Neurological:      General: No focal deficit present.      Mental Status: She is alert  and oriented to person, place, and time.   Skin:     General: Skin is warm and dry.      Findings: No rash.   Psychiatric:         Mood and Affect: Mood normal.          Problem List Items Addressed This Visit       Routine postpartum follow-up - Primary    Overview     She delivered a girl by primary CS for breech on 12/18/2023. Velamentous cord insertion was also noted. Delivery and postpartum course were uncomplicated.         Current Assessment & Plan     No pap is indicated. She will return to normal activity. Prenatal vitamins are encouraged. She will call if the new left groin pain fails to resolve. We will consider pelvic floor physical therapy in that case.

## 2024-02-01 ENCOUNTER — OFFICE VISIT (OUTPATIENT)
Dept: OBSTETRICS AND GYNECOLOGY | Facility: CLINIC | Age: 24
End: 2024-02-01
Payer: COMMERCIAL

## 2024-02-01 VITALS
DIASTOLIC BLOOD PRESSURE: 64 MMHG | BODY MASS INDEX: 21.14 KG/M2 | SYSTOLIC BLOOD PRESSURE: 102 MMHG | WEIGHT: 112 LBS | HEIGHT: 61 IN

## 2024-02-01 PROCEDURE — 99213 OFFICE O/P EST LOW 20 MIN: CPT | Performed by: OBSTETRICS & GYNECOLOGY

## 2024-02-01 PROCEDURE — 1036F TOBACCO NON-USER: CPT | Performed by: OBSTETRICS & GYNECOLOGY

## 2024-02-01 NOTE — ASSESSMENT & PLAN NOTE
No pap is indicated. She will return to normal activity. Prenatal vitamins are encouraged. She will call if the new left groin pain fails to resolve. We will consider pelvic floor physical therapy in that case.

## 2024-03-22 ENCOUNTER — OFFICE VISIT (OUTPATIENT)
Dept: PRIMARY CARE | Facility: CLINIC | Age: 24
End: 2024-03-22
Payer: COMMERCIAL

## 2024-03-22 VITALS
WEIGHT: 109 LBS | HEART RATE: 87 BPM | OXYGEN SATURATION: 98 % | BODY MASS INDEX: 20.6 KG/M2 | TEMPERATURE: 98.2 F | SYSTOLIC BLOOD PRESSURE: 110 MMHG | DIASTOLIC BLOOD PRESSURE: 74 MMHG

## 2024-03-22 DIAGNOSIS — M54.50 CHRONIC LOW BACK PAIN WITHOUT SCIATICA, UNSPECIFIED BACK PAIN LATERALITY: ICD-10-CM

## 2024-03-22 DIAGNOSIS — G89.29 CHRONIC LOW BACK PAIN WITHOUT SCIATICA, UNSPECIFIED BACK PAIN LATERALITY: ICD-10-CM

## 2024-03-22 DIAGNOSIS — R22.1 LUMP IN NECK: Primary | ICD-10-CM

## 2024-03-22 DIAGNOSIS — H69.91 DISORDER OF RIGHT EUSTACHIAN TUBE: ICD-10-CM

## 2024-03-22 PROBLEM — K21.9 GASTROESOPHAGEAL REFLUX DISEASE WITHOUT ESOPHAGITIS: Status: RESOLVED | Noted: 2023-08-27 | Resolved: 2024-03-22

## 2024-03-22 PROBLEM — R63.5 ABNORMAL WEIGHT GAIN: Status: RESOLVED | Noted: 2023-08-27 | Resolved: 2024-03-22

## 2024-03-22 PROCEDURE — 99213 OFFICE O/P EST LOW 20 MIN: CPT | Performed by: NURSE PRACTITIONER

## 2024-03-22 PROCEDURE — 1036F TOBACCO NON-USER: CPT | Performed by: NURSE PRACTITIONER

## 2024-03-22 ASSESSMENT — ENCOUNTER SYMPTOMS
WEAKNESS: 0
NUMBNESS: 0
FEVER: 0
BACK PAIN: 1
SINUS PAIN: 0
RHINORRHEA: 0
SHORTNESS OF BREATH: 0
COUGH: 0
VOICE CHANGE: 0
SORE THROAT: 0
WHEEZING: 0
FATIGUE: 0
CHILLS: 0

## 2024-03-22 ASSESSMENT — PATIENT HEALTH QUESTIONNAIRE - PHQ9
SUM OF ALL RESPONSES TO PHQ9 QUESTIONS 1 AND 2: 0
1. LITTLE INTEREST OR PLEASURE IN DOING THINGS: NOT AT ALL
2. FEELING DOWN, DEPRESSED OR HOPELESS: NOT AT ALL

## 2024-03-22 NOTE — PROGRESS NOTES
"Kelsey Kaufman \"Mayra\" is a 23 y.o. female who presents for Mass (Small lump just on under her chin area on her neck. Been here for two weeks, no difference in growth, denies sensitive to touch and no discharge. Does not have any redness or any color.).    HPI  She presents to the office today for evaluation of a lump under her chin in neck which she noticed about 2 weeks ago.  Feels it is fluctuating in size- some days bigger than others.   It is not painful.   No other lumps noted.  No dental issues. No sore throat.  No recent illnesses.  On and off for almost a year will notice her right ear feels full. At times it will pop and have muffled hearing.   No ear pain.   No drainage from the ear.     Fell down stairs when she was 8-9 years and hurt back but was not evaluated. Reports was sore for a couple weeks. Since then occasionally back feels tight and she has to crack it.   No radiation of pain down the legs.  No numbness, tingling, weakness.  No bowel or bladder issues.  Reports after  it seems to be worse.    Review of Systems   Constitutional:  Negative for chills, fatigue and fever.   HENT:  Negative for congestion, ear discharge, ear pain, postnasal drip, rhinorrhea, sinus pain, sore throat and voice change.    Respiratory:  Negative for cough, shortness of breath and wheezing.    Musculoskeletal:  Positive for back pain.   Neurological:  Negative for weakness and numbness.       Objective   /74 (BP Location: Left arm, Patient Position: Sitting)   Pulse 87   Temp 36.8 °C (98.2 °F) (Temporal)   Wt 49.4 kg (109 lb)   SpO2 98%   BMI 20.60 kg/m²     Physical Exam  Constitutional:       General: She is not in acute distress.     Appearance: Normal appearance. She is not toxic-appearing.   HENT:      Right Ear: Hearing, tympanic membrane, ear canal and external ear normal.      Left Ear: Hearing, tympanic membrane, ear canal and external ear normal.      Nose:      " Right Sinus: No maxillary sinus tenderness or frontal sinus tenderness.      Left Sinus: No maxillary sinus tenderness or frontal sinus tenderness.      Mouth/Throat:      Pharynx: No pharyngeal swelling or posterior oropharyngeal erythema.   Cardiovascular:      Rate and Rhythm: Normal rate and regular rhythm.      Pulses: Normal pulses.      Heart sounds: Normal heart sounds, S1 normal and S2 normal. No murmur heard.  Pulmonary:      Effort: Pulmonary effort is normal.      Breath sounds: Normal breath sounds and air entry.   Musculoskeletal:      Lumbar back: No tenderness or bony tenderness. Normal range of motion. Negative right straight leg raise test and negative left straight leg raise test.      Right lower leg: No edema.      Left lower leg: No edema.   Lymphadenopathy:      Cervical: No cervical adenopathy.      Comments: (+) pea sized mobile lymph noted noted right submandibular region  Similar on the left but not quite as prominent.   Neurological:      Mental Status: She is alert and oriented to person, place, and time.      Sensory: Sensation is intact.      Motor: Motor function is intact.   Psychiatric:         Mood and Affect: Mood normal.         Behavior: Behavior normal.         Thought Content: Thought content normal.         Judgment: Judgment normal.         Assessment/Plan   Problem List Items Addressed This Visit       Low back pain     Home stretches- PT if no improvement in symptoms.         Disorder of right eustachian tube     Fluticasone nasal spray.         Lump in neck - Primary     Pea sized and mobile submandibular lymph node- advised to monitor and return to the office if larger or bothersome.            It has been a pleasure seeing you today!

## 2024-03-22 NOTE — PATIENT INSTRUCTIONS
Return to the office if the area in the neck should become larger or bothersome.  Flonase for right ear fullness.  May consider PT if home stretches and increased activity do not help low back pain.

## 2024-03-23 NOTE — ASSESSMENT & PLAN NOTE
Pea sized and mobile submandibular lymph node- advised to monitor and return to the office if larger or bothersome.

## 2024-05-28 ENCOUNTER — TELEPHONE (OUTPATIENT)
Dept: OBSTETRICS AND GYNECOLOGY | Facility: CLINIC | Age: 24
End: 2024-05-28
Payer: COMMERCIAL

## 2024-05-28 DIAGNOSIS — O36.80X0 PREGNANCY WITH INCONCLUSIVE FETAL VIABILITY, SINGLE OR UNSPECIFIED FETUS (HHS-HCC): ICD-10-CM

## 2024-06-19 ENCOUNTER — APPOINTMENT (OUTPATIENT)
Dept: OBSTETRICS AND GYNECOLOGY | Facility: CLINIC | Age: 24
End: 2024-06-19
Payer: COMMERCIAL

## 2024-06-19 ENCOUNTER — HOSPITAL ENCOUNTER (OUTPATIENT)
Dept: RADIOLOGY | Facility: CLINIC | Age: 24
Discharge: HOME | End: 2024-06-19
Payer: COMMERCIAL

## 2024-06-19 VITALS — DIASTOLIC BLOOD PRESSURE: 72 MMHG | WEIGHT: 103 LBS | BODY MASS INDEX: 19.46 KG/M2 | SYSTOLIC BLOOD PRESSURE: 118 MMHG

## 2024-06-19 DIAGNOSIS — O34.219 UTERINE SCAR FROM PREVIOUS CESAREAN DELIVERY COMPLICATING PREGNANCY (HHS-HCC): Primary | ICD-10-CM

## 2024-06-19 DIAGNOSIS — O36.80X0 PREGNANCY WITH INCONCLUSIVE FETAL VIABILITY, SINGLE OR UNSPECIFIED FETUS (HHS-HCC): ICD-10-CM

## 2024-06-19 PROBLEM — Z3A.08 8 WEEKS GESTATION OF PREGNANCY (HHS-HCC): Status: ACTIVE | Noted: 2024-06-19

## 2024-06-19 PROBLEM — O09.621 YOUNG MULTIGRAVIDA IN FIRST TRIMESTER (HHS-HCC): Status: ACTIVE | Noted: 2024-06-19

## 2024-06-19 PROCEDURE — 87591 N.GONORRHOEAE DNA AMP PROB: CPT

## 2024-06-19 PROCEDURE — 0500F INITIAL PRENATAL CARE VISIT: CPT | Performed by: OBSTETRICS & GYNECOLOGY

## 2024-06-19 PROCEDURE — 88175 CYTOPATH C/V AUTO FLUID REDO: CPT

## 2024-06-19 PROCEDURE — 76801 OB US < 14 WKS SINGLE FETUS: CPT | Performed by: OBSTETRICS & GYNECOLOGY

## 2024-06-19 PROCEDURE — 76801 OB US < 14 WKS SINGLE FETUS: CPT

## 2024-06-19 PROCEDURE — 87491 CHLMYD TRACH DNA AMP PROBE: CPT

## 2024-06-19 PROCEDURE — 87086 URINE CULTURE/COLONY COUNT: CPT

## 2024-06-19 NOTE — PROGRESS NOTES
Subjective   Patient ID [id number]   Mayra Kaufman is a 24 y.o.  at 8w1d with a working estimated date of delivery of 2025, by Last Menstrual Period who presents for an initial prenatal visit. This pregnancy is planned.    Her pregnancy is complicated by:  Closely spaced pregnancy and prior CS for breech.    OB History    Para Term  AB Living   2 1 1     1   SAB IAB Ectopic Multiple Live Births         0 1      # Outcome Date GA Lbr Ankur/2nd Weight Sex Delivery Anes PTL Lv   2 Current            1 Term 24 39w2d  2.98 kg F CS-LTranv Spinal  NUHA          Objective   Physical Exam  Weight: 46.7 kg (103 lb)  Expected Total Weight Gain: 11.5 kg (25 lb)-16 kg (35 lb)   Pregravid BMI: 18.53  BP: 118/72          Physical Exam  Constitutional:       Appearance: Normal appearance.   Genitourinary:      Bladder, rectum and urethral meatus normal.      Right Labia: No rash or lesions.     Left Labia: No lesions or rash.     No vaginal discharge.      No vaginal prolapse present.     No vaginal atrophy present.       Right Adnexa: not tender and no mass present.     Left Adnexa: not tender and no mass present.     No cervical discharge or lesion.      Uterus is enlarged.      Pelvic exam was performed with patient in the lithotomy position.   Breasts:     Breasts are soft.     Right: Normal.      Left: Normal.   HENT:      Head: Normocephalic and atraumatic.      Nose: Nose normal.   Cardiovascular:      Rate and Rhythm: Normal rate and regular rhythm.      Heart sounds: Normal heart sounds.   Pulmonary:      Effort: Pulmonary effort is normal.      Breath sounds: Normal breath sounds.   Abdominal:      Palpations: Abdomen is soft.   Musculoskeletal:      Cervical back: Neck supple.   Neurological:      General: No focal deficit present.      Mental Status: She is alert and oriented to person, place, and time.   Skin:     General: Skin is warm and dry.      Findings: No rash.    Psychiatric:         Mood and Affect: Mood normal.       Prenatal Labs  Are ordered.    Problem List Items Addressed This Visit       Uterine scar from previous  delivery complicating pregnancy ([facility]) - Primary    Overview     CS for breech 2023. Plan repeat CS for delivery given close spacing.            Immunizations: up to date  Prenatal Labs ordered  Daily prenatal vitamins are recommended.  First trimester screening and second trimester screening discussed. Patient decided to proceed with NIPS and she already had negative carrier screening.  Follow up in 4 weeks for return OB visit.

## 2024-06-20 LAB — BACTERIA UR CULT: NO GROWTH

## 2024-06-21 LAB
C TRACH RRNA SPEC QL NAA+PROBE: NEGATIVE
N GONORRHOEA DNA SPEC QL PROBE+SIG AMP: NEGATIVE

## 2024-06-23 LAB
CYTOLOGY CMNT CVX/VAG CYTO-IMP: NORMAL
LAB AP HPV HR: NORMAL
LAB AP PAP ADDITIONAL TESTS: NORMAL
LABORATORY COMMENT REPORT: NORMAL
LMP START DATE: NORMAL
MENSTRUAL HX REPORTED: NORMAL
PATH REPORT.TOTAL CANCER: NORMAL

## 2024-07-03 ENCOUNTER — LAB (OUTPATIENT)
Dept: LAB | Facility: LAB | Age: 24
End: 2024-07-03
Payer: COMMERCIAL

## 2024-07-03 DIAGNOSIS — O34.219 UTERINE SCAR FROM PREVIOUS CESAREAN DELIVERY COMPLICATING PREGNANCY (HHS-HCC): ICD-10-CM

## 2024-07-03 LAB
ABO GROUP (TYPE) IN BLOOD: NORMAL
ANTIBODY SCREEN: NORMAL
ERYTHROCYTE [DISTWIDTH] IN BLOOD BY AUTOMATED COUNT: 12.6 % (ref 11.5–14.5)
HCT VFR BLD AUTO: 40.4 % (ref 36–46)
HGB BLD-MCNC: 13.4 G/DL (ref 12–16)
MCH RBC QN AUTO: 29.2 PG (ref 26–34)
MCHC RBC AUTO-ENTMCNC: 33.2 G/DL (ref 32–36)
MCV RBC AUTO: 88 FL (ref 80–100)
NRBC BLD-RTO: 0 /100 WBCS (ref 0–0)
PLATELET # BLD AUTO: 388 X10*3/UL (ref 150–450)
RBC # BLD AUTO: 4.59 X10*6/UL (ref 4–5.2)
RH FACTOR (ANTIGEN D): NORMAL
WBC # BLD AUTO: 11.5 X10*3/UL (ref 4.4–11.3)

## 2024-07-03 PROCEDURE — 86900 BLOOD TYPING SEROLOGIC ABO: CPT

## 2024-07-03 PROCEDURE — 87389 HIV-1 AG W/HIV-1&-2 AB AG IA: CPT

## 2024-07-03 PROCEDURE — 85027 COMPLETE CBC AUTOMATED: CPT

## 2024-07-03 PROCEDURE — 86317 IMMUNOASSAY INFECTIOUS AGENT: CPT

## 2024-07-03 PROCEDURE — 36415 COLL VENOUS BLD VENIPUNCTURE: CPT

## 2024-07-03 PROCEDURE — 87340 HEPATITIS B SURFACE AG IA: CPT

## 2024-07-03 PROCEDURE — 86780 TREPONEMA PALLIDUM: CPT

## 2024-07-03 PROCEDURE — 86850 RBC ANTIBODY SCREEN: CPT

## 2024-07-03 PROCEDURE — 86901 BLOOD TYPING SEROLOGIC RH(D): CPT

## 2024-07-04 LAB
HBV SURFACE AG SERPL QL IA: NONREACTIVE
HIV 1+2 AB+HIV1 P24 AG SERPL QL IA: NONREACTIVE
REFLEX ADDED, ANEMIA PANEL: NORMAL
RUBV IGG SERPL IA-ACNC: 2.5 IA
RUBV IGG SERPL QL IA: POSITIVE
TREPONEMA PALLIDUM IGG+IGM AB [PRESENCE] IN SERUM OR PLASMA BY IMMUNOASSAY: NONREACTIVE

## 2024-07-12 LAB — SCAN RESULT: NORMAL

## 2024-07-17 LAB — SCAN RESULT: NORMAL

## 2024-07-24 PROBLEM — Z3A.13 13 WEEKS GESTATION OF PREGNANCY (HHS-HCC): Status: ACTIVE | Noted: 2024-06-19

## 2024-07-25 ENCOUNTER — APPOINTMENT (OUTPATIENT)
Dept: OBSTETRICS AND GYNECOLOGY | Facility: CLINIC | Age: 24
End: 2024-07-25
Payer: COMMERCIAL

## 2024-07-25 ENCOUNTER — HOSPITAL ENCOUNTER (OUTPATIENT)
Dept: RADIOLOGY | Facility: CLINIC | Age: 24
Discharge: HOME | End: 2024-07-25
Payer: COMMERCIAL

## 2024-07-25 VITALS — DIASTOLIC BLOOD PRESSURE: 60 MMHG | SYSTOLIC BLOOD PRESSURE: 102 MMHG | BODY MASS INDEX: 19.46 KG/M2 | WEIGHT: 103 LBS

## 2024-07-25 DIAGNOSIS — Z3A.13 13 WEEKS GESTATION OF PREGNANCY (HHS-HCC): ICD-10-CM

## 2024-07-25 DIAGNOSIS — O34.219 UTERINE SCAR FROM PREVIOUS CESAREAN DELIVERY COMPLICATING PREGNANCY (HHS-HCC): Primary | ICD-10-CM

## 2024-07-25 DIAGNOSIS — O36.80X0 PREGNANCY WITH INCONCLUSIVE FETAL VIABILITY, SINGLE OR UNSPECIFIED FETUS (HHS-HCC): ICD-10-CM

## 2024-07-25 PROCEDURE — 76813 OB US NUCHAL MEAS 1 GEST: CPT

## 2024-07-25 PROCEDURE — 0501F PRENATAL FLOW SHEET: CPT | Performed by: OBSTETRICS & GYNECOLOGY

## 2024-07-25 PROCEDURE — 76813 OB US NUCHAL MEAS 1 GEST: CPT | Performed by: MEDICAL GENETICS

## 2024-07-25 NOTE — PROGRESS NOTES
"Subjective   Patient ID [id number]   Marilou Kaufman \"Mayra\" is a 24 y.o.   at 13w2d with a working estimated date of delivery of 2025, by Last Menstrual Period who presents for a routine prenatal visit. She denies vaginal bleeding, leakage of fluid, decreased fetal movements, or contractions.  [facility]N report is pending.   They have a \"once in a lifetime\" trip planned to [facility] to meet family planned for November. We reviewed risks of travel with recommendations to finish travel prior to 32 weeks. We reviewed recommendation for frequent ambulation, hydration and compression socks.     Objective   Physical Exam  Weight: 46.7 kg (103 lb)  Expected Total Weight Gain: 11.5 kg (25 lb)-16 kg (35 lb)   Pregravid BMI: 18.53  BP: 102/60         Prenatal Labs  Urine dip:  No results found for: \"KETONESU\", \"GLUCOSEUR\", \"LEUKOCYTESUR\"    Lab Results   Component Value Date    HGB 13.4 2024    HCT 40.4 2024    ABO O 2024    HEPBSAG Nonreactive 2024         Problem List Items Addressed This Visit       Uterine scar from previous  delivery complicating pregnancy ([facility]) - Primary    Overview     CS for breech 2023. She desires [facility] for delivery given close spacing. Risk of uterine rupture is reviewed.         13 weeks gestation of pregnancy ([facility])        Continue prenatal vitamin.  Labs reviewed.    Follow up as scheduled for a routine prenatal visit.  "

## 2024-08-14 PROBLEM — Z3A.16 16 WEEKS GESTATION OF PREGNANCY (HHS-HCC): Status: ACTIVE | Noted: 2024-06-19

## 2024-08-15 ENCOUNTER — APPOINTMENT (OUTPATIENT)
Dept: OBSTETRICS AND GYNECOLOGY | Facility: CLINIC | Age: 24
End: 2024-08-15
Payer: COMMERCIAL

## 2024-08-15 VITALS — SYSTOLIC BLOOD PRESSURE: 100 MMHG | BODY MASS INDEX: 20.22 KG/M2 | WEIGHT: 107 LBS | DIASTOLIC BLOOD PRESSURE: 60 MMHG

## 2024-08-15 DIAGNOSIS — Z3A.16 16 WEEKS GESTATION OF PREGNANCY (HHS-HCC): ICD-10-CM

## 2024-08-15 DIAGNOSIS — O34.219 UTERINE SCAR FROM PREVIOUS CESAREAN DELIVERY COMPLICATING PREGNANCY (HHS-HCC): Primary | ICD-10-CM

## 2024-08-15 PROCEDURE — 0501F PRENATAL FLOW SHEET: CPT | Performed by: OBSTETRICS & GYNECOLOGY

## 2024-08-15 NOTE — PROGRESS NOTES
"Subjective   Patient ID [id number]   Marilou Kaufman \"Mayra\" is a 24 y.o.   at 16w2d with a working estimated date of delivery of 2025, by Last Menstrual Period who presents for a routine prenatal visit. She denies vaginal bleeding, leakage of fluid, decreased fetal movements, or contractions.  No concerns.     Objective   Physical Exam  Weight: 48.5 kg (107 lb)  Expected Total Weight Gain: 11.5 kg (25 lb)-16 kg (35 lb)   Pregravid BMI: 18.53  BP: 100/60  Fetal Heart Rate: 150      Prenatal Labs  Urine dip:  No results found for: \"KETONESU\", \"GLUCOSEUR\", \"LEUKOCYTESUR\"    Lab Results   Component Value Date    HGB 13.4 2024    HCT 40.4 2024    ABO O 2024    HEPBSAG Nonreactive 2024         Problem List Items Addressed This Visit       Uterine scar from previous  delivery complicating pregnancy ([facility]) - Primary    Overview     CS for breech 2023. She desires [facility] for delivery given close spacing. Risk of uterine rupture is reviewed.         16 weeks gestation of pregnancy ([facility])    Overview     Desired provider in labor: [] CNM  [] Physician  [x] Blood Products: [x] Yes, accepts [] No, needs counseling  [x] Initial BMI: 18.53   [x] Prenatal Labs:   [x] Cervical Cancer Screening up to date  [x] Rh status: O pos  [x] Genetic Screening:  NIPS risk reducing.  [x] NT US: (11-13 wks)  [x] Baby ASA (if indicated):NA  [] Pregnancy dated by:     [] Anatomy US: (19-20 wks)  [] Federal Sterilization consent signed (if indicated):  [] 1hr GCT at 24-28wks:  [] Rhogam (if indicated):   [] Fetal Surveillance (if indicated):  [] Tdap (27-32 wks, may be given up to 36 wks if initial window missed):   [] RSV (32-36 wks) (Sept. to end of ):   [] Flu Vaccine:    [] Breastfeeding:  [] Postpartum Birth control method:   [] GBS at 36 - 37 wks:  [] 39 weeks discussion of IOL vs. Expectant management:  [] Mode of delivery ( anticipated ):               Continue " prenatal vitamin.  Labs reviewed.    Follow up as scheduled for a routine prenatal visit.

## 2024-09-10 PROBLEM — Z3A.20 20 WEEKS GESTATION OF PREGNANCY (HHS-HCC): Status: ACTIVE | Noted: 2024-06-19

## 2024-09-12 ENCOUNTER — HOSPITAL ENCOUNTER (OUTPATIENT)
Dept: RADIOLOGY | Facility: CLINIC | Age: 24
Discharge: HOME | End: 2024-09-12
Payer: COMMERCIAL

## 2024-09-12 ENCOUNTER — APPOINTMENT (OUTPATIENT)
Dept: OBSTETRICS AND GYNECOLOGY | Facility: CLINIC | Age: 24
End: 2024-09-12
Payer: COMMERCIAL

## 2024-09-12 VITALS — BODY MASS INDEX: 21.35 KG/M2 | SYSTOLIC BLOOD PRESSURE: 100 MMHG | WEIGHT: 113 LBS | DIASTOLIC BLOOD PRESSURE: 64 MMHG

## 2024-09-12 DIAGNOSIS — Z3A.20 20 WEEKS GESTATION OF PREGNANCY (HHS-HCC): ICD-10-CM

## 2024-09-12 DIAGNOSIS — O34.219 UTERINE SCAR FROM PREVIOUS CESAREAN DELIVERY COMPLICATING PREGNANCY (HHS-HCC): Primary | ICD-10-CM

## 2024-09-12 DIAGNOSIS — O36.80X0 PREGNANCY WITH INCONCLUSIVE FETAL VIABILITY, SINGLE OR UNSPECIFIED FETUS (HHS-HCC): ICD-10-CM

## 2024-09-12 DIAGNOSIS — K21.00 GASTROESOPHAGEAL REFLUX DISEASE WITH ESOPHAGITIS WITHOUT HEMORRHAGE: ICD-10-CM

## 2024-09-12 PROBLEM — S30.1XXA HEMATOMA OF ABDOMINAL WALL, INITIAL ENCOUNTER: Status: RESOLVED | Noted: 2024-01-03 | Resolved: 2024-09-12

## 2024-09-12 PROCEDURE — 90471 IMMUNIZATION ADMIN: CPT | Performed by: OBSTETRICS & GYNECOLOGY

## 2024-09-12 PROCEDURE — 0501F PRENATAL FLOW SHEET: CPT | Performed by: OBSTETRICS & GYNECOLOGY

## 2024-09-12 PROCEDURE — 90656 IIV3 VACC NO PRSV 0.5 ML IM: CPT | Performed by: OBSTETRICS & GYNECOLOGY

## 2024-09-12 PROCEDURE — 76805 OB US >/= 14 WKS SNGL FETUS: CPT

## 2024-09-12 RX ORDER — FAMOTIDINE 20 MG/1
20 TABLET, FILM COATED ORAL 2 TIMES DAILY
Qty: 180 TABLET | Refills: 3 | Status: SHIPPED | OUTPATIENT
Start: 2024-09-12 | End: 2025-09-12

## 2024-10-04 PROBLEM — Z3A.24 24 WEEKS GESTATION OF PREGNANCY (HHS-HCC): Status: ACTIVE | Noted: 2024-06-19

## 2024-10-10 ENCOUNTER — APPOINTMENT (OUTPATIENT)
Dept: OBSTETRICS AND GYNECOLOGY | Facility: CLINIC | Age: 24
End: 2024-10-10
Payer: COMMERCIAL

## 2024-10-10 ENCOUNTER — LAB (OUTPATIENT)
Dept: LAB | Facility: LAB | Age: 24
End: 2024-10-10
Payer: COMMERCIAL

## 2024-10-10 VITALS — WEIGHT: 121 LBS | SYSTOLIC BLOOD PRESSURE: 104 MMHG | DIASTOLIC BLOOD PRESSURE: 66 MMHG | BODY MASS INDEX: 22.86 KG/M2

## 2024-10-10 DIAGNOSIS — Z3A.24 24 WEEKS GESTATION OF PREGNANCY (HHS-HCC): ICD-10-CM

## 2024-10-10 DIAGNOSIS — O34.219 UTERINE SCAR FROM PREVIOUS CESAREAN DELIVERY COMPLICATING PREGNANCY (HHS-HCC): Primary | ICD-10-CM

## 2024-10-10 LAB
ERYTHROCYTE [DISTWIDTH] IN BLOOD BY AUTOMATED COUNT: 12.9 % (ref 11.5–14.5)
GLUCOSE 1H P 50 G GLC PO SERPL-MCNC: 64 MG/DL
HCT VFR BLD AUTO: 40.2 % (ref 36–46)
HGB BLD-MCNC: 13.2 G/DL (ref 12–16)
MCH RBC QN AUTO: 29.8 PG (ref 26–34)
MCHC RBC AUTO-ENTMCNC: 32.8 G/DL (ref 32–36)
MCV RBC AUTO: 91 FL (ref 80–100)
NRBC BLD-RTO: 0 /100 WBCS (ref 0–0)
PLATELET # BLD AUTO: 309 X10*3/UL (ref 150–450)
RBC # BLD AUTO: 4.43 X10*6/UL (ref 4–5.2)
REFLEX ADDED, ANEMIA PANEL: NORMAL
TREPONEMA PALLIDUM IGG+IGM AB [PRESENCE] IN SERUM OR PLASMA BY IMMUNOASSAY: NONREACTIVE
WBC # BLD AUTO: 11.6 X10*3/UL (ref 4.4–11.3)

## 2024-10-10 PROCEDURE — 36415 COLL VENOUS BLD VENIPUNCTURE: CPT

## 2024-10-10 PROCEDURE — 0501F PRENATAL FLOW SHEET: CPT | Performed by: OBSTETRICS & GYNECOLOGY

## 2024-10-10 PROCEDURE — 85027 COMPLETE CBC AUTOMATED: CPT

## 2024-10-10 PROCEDURE — 86780 TREPONEMA PALLIDUM: CPT

## 2024-10-10 PROCEDURE — 82947 ASSAY GLUCOSE BLOOD QUANT: CPT

## 2024-10-10 NOTE — LETTER
To Whom it May Concern,    Marilou Trujillo is being seen by Dr. Eugenia Guadarrama MD for management of her current pregnancy. It has been advised that patient avoids all exposure to marijuana, including second hand marijuana smoke. Studies have proven that exposure to marijuana may increase risk of spontaneous  birth and small for gestational-age infants. Patient expresses that due to her apartment neighbor's marijuana use, she is frequently being exposed against her will. This has been brought up with apartment management with no resolution of concern. Due to the concern for unsafe living environment during pregnancy, Dr Eugenia Guadarrama MD is kindly requesting that patient is able to terminate her apartment lease without any penalty charges for terminating her lease early.     Thank you,        Dr. Eugenia Guadarrama MD

## 2024-10-10 NOTE — PROGRESS NOTES
"Subjective   Patient ID [id number]   Marilou Kaufman \"Mayra\" is a 24 y.o.   at 24w2d with a working estimated date of delivery of 2025, by Last Menstrual Period who presents for a routine prenatal visit. She denies vaginal bleeding, leakage of fluid, decreased fetal movements, or contractions.  Their neighbor is smoking week and she states her entire apartment has a very strong odor. There has been no improvement after discussions with landlord. She is very concerned about potential health effects this drug exposure has for her and her children. She asks for a note to be provided to her landlord asking for her lease to be broken.     Objective   Physical Exam  Weight: 54.9 kg (121 lb)  Expected Total Weight Gain: 11.5 kg (25 lb)-16 kg (35 lb)   Pregravid BMI: 18.53  BP: 104/66  Fetal Heart Rate: 160 Fundal Height (cm): 24 cm    Prenatal Labs  Urine dip:  No results found for: \"KETONESU\", \"GLUCOSEUR\", \"LEUKOCYTESUR\"    Lab Results   Component Value Date    HGB 13.4 2024    HCT 40.4 2024    ABO O 2024    HEPBSAG Nonreactive 2024         Problem List Items Addressed This Visit       Uterine scar from previous  delivery complicating pregnancy ([facility]) - Primary    Overview     CS for breech 2023. She desires [facility] for delivery given close spacing. Risk of uterine rupture is reviewed.         24 weeks gestation of pregnancy ([facility])    Overview     Desired provider in labor: [] CNM  [] Physician  [x] Blood Products: [x] Yes, accepts [] No, needs counseling  [x] Initial BMI: 18.53   [x] Prenatal Labs:   [x] Cervical Cancer Screening up to date  [x] Rh status: O pos  [x] Genetic Screening:  NIPS risk reducing.  [x] NT US: (11-13 wks)  [x] Baby ASA (if indicated):NA  [x] Pregnancy dated by: lmp    [] Anatomy US: (19-20 wks)  [] Federal Sterilization consent signed (if indicated):  [] 1hr GCT at 24-28wks:  [] Rhogam (if indicated): not indicated  [] Fetal " Surveillance (if indicated):  [] Tdap (27-32 wks, may be given up to 36 wks if initial window missed):   [] RSV (32-36 wks) (Sept. to end of Jan):   [x] Flu Vaccine:9/12/2024    [] Breastfeeding:  [] Postpartum Birth control method:   [] GBS at 36 - 37 wks:  [] 39 weeks discussion of IOL vs. Expectant management:  [] Mode of delivery ( anticipated ):           Relevant Orders    Glucose, 1 Hour Screen, Pregnancy    CBC Anemia Panel With Reflex,Pregnancy    Syphilis Screen with Reflex        Continue prenatal vitamin.  Labs reviewed.  Glucola today.   Follow up as scheduled for a routine prenatal visit.

## 2024-11-06 PROBLEM — Z3A.28 28 WEEKS GESTATION OF PREGNANCY (HHS-HCC): Status: ACTIVE | Noted: 2024-06-19

## 2024-11-07 ENCOUNTER — APPOINTMENT (OUTPATIENT)
Dept: OBSTETRICS AND GYNECOLOGY | Facility: CLINIC | Age: 24
End: 2024-11-07
Payer: COMMERCIAL

## 2024-11-07 VITALS — WEIGHT: 125 LBS | SYSTOLIC BLOOD PRESSURE: 104 MMHG | DIASTOLIC BLOOD PRESSURE: 64 MMHG | BODY MASS INDEX: 23.62 KG/M2

## 2024-11-07 DIAGNOSIS — Z23 NEED FOR TDAP VACCINATION: ICD-10-CM

## 2024-11-07 DIAGNOSIS — Z3A.28 28 WEEKS GESTATION OF PREGNANCY (HHS-HCC): ICD-10-CM

## 2024-11-07 DIAGNOSIS — O34.219 UTERINE SCAR FROM PREVIOUS CESAREAN DELIVERY COMPLICATING PREGNANCY (HHS-HCC): Primary | ICD-10-CM

## 2024-11-07 PROBLEM — O09.623 YOUNG MULTIGRAVIDA IN THIRD TRIMESTER (HHS-HCC): Status: ACTIVE | Noted: 2024-06-19

## 2024-11-07 PROCEDURE — 90471 IMMUNIZATION ADMIN: CPT | Performed by: OBSTETRICS & GYNECOLOGY

## 2024-11-07 PROCEDURE — 0501F PRENATAL FLOW SHEET: CPT | Performed by: OBSTETRICS & GYNECOLOGY

## 2024-11-07 PROCEDURE — 90715 TDAP VACCINE 7 YRS/> IM: CPT | Performed by: OBSTETRICS & GYNECOLOGY

## 2024-11-07 NOTE — LETTER
11/7/2024    To Whom It May Concern:    Marilou Kaufman is being followed for her pregnancy at [facility].  She has an estimated Date of Delivery is 1/28/25 and is currently 28 weeks without complications contraindicating her from travel.      Sincerely,        Eugenia Guadarrama MD  [facility]

## 2024-11-07 NOTE — PROGRESS NOTES
"Subjective   Patient ID [id number]   Marilou Kaufman \"Mayra\" is a 24 y.o.   at 28w2d with a working estimated date of delivery of 2025, by Last Menstrual Period who presents for a routine prenatal visit. She had a URI recently and still has a lingering non-productive cough. She will try Mucinex and can use dextromethorphan cough suppressant to sleep. The family is traveling to [facility] in two weeks and she desires a letter for the airline. We reviewed the recommendation for hydration, compression socks and frequent ambulation during travel.     Objective   Physical Exam  Weight: 56.7 kg (125 lb)  Expected Total Weight Gain: 11.5 kg (25 lb)-16 kg (35 lb)   Pregravid BMI: 18.53  BP: 104/64  Fetal Heart Rate: 140 Fundal Height (cm): 28 cm    Prenatal Labs  Urine dip:  No results found for: \"KETONESU\", \"GLUCOSEUR\", \"LEUKOCYTESUR\"    Lab Results   Component Value Date    HGB 13.2 10/10/2024    HCT 40.2 10/10/2024    ABO O 2024    HEPBSAG Nonreactive 2024         Problem List Items Addressed This Visit       Uterine scar from previous  delivery complicating pregnancy ([facility]) - Primary    Overview     CS for breech 2023. She desires TOL[facility]  at [facility] for delivery given close spacing. Risk of uterine rupture is reviewed.         28 weeks gestation of pregnancy ([facility])    Overview     Desired provider in labor: [] CNM  [] Physician  [x] Blood Products: [x] Yes, accepts [] No, needs counseling  [x] Initial BMI: 18.53   [x] Prenatal Labs:   [x] Cervical Cancer Screening up to date  [x] Rh status: O pos  [x] Genetic Screening:  NIPS risk reducing.  [x] NT US: (11-13 wks)  [x] Baby ASA (if indicated):NA  [x] Pregnancy dated by: lmp    [] Anatomy US: (19-20 wks)  [] Federal Sterilization consent signed (if indicated):  [x] 1hr GCT at 24-28wks: 64  [] Rhogam (if indicated): not indicated  [] Fetal Surveillance (if indicated):  [x] Tdap (27-32 wks, may be given up to 36 wks if " initial window missed): 11/7/2024.  [] RSV (32-36 wks) (Sept. to end of Jan):   [x] Flu Vaccine:9/12/2024    [] Breastfeeding:  [] Postpartum Birth control method:   [] GBS at 36 - 37 wks:  [] 39 weeks discussion of IOL vs. Expectant management:  [] Mode of delivery ( anticipated ):            Other Visit Diagnoses       Need for Tdap vaccination        Relevant Orders    Tdap vaccine, age 7 years and older  ([employer]RI[employer])             Continue prenatal vitamin.  Labs reviewed.  Tdap today.  Follow up as scheduled for a routine prenatal visit.

## 2024-11-07 NOTE — LETTER
11/7/2024    To Whom It May Concern:    Mayra Kaufman is being followed for her pregnancy at [facility].  She has an estimated due date of January 28,2025 and is currently 28 weeks without complications contraindicating her from travel.    Sincerely,        Eugenia Guadarrama MD  [facility]

## 2024-11-21 ENCOUNTER — APPOINTMENT (OUTPATIENT)
Dept: OBSTETRICS AND GYNECOLOGY | Facility: CLINIC | Age: 24
End: 2024-11-21
Payer: COMMERCIAL

## 2024-12-04 PROBLEM — Z3A.32 32 WEEKS GESTATION OF PREGNANCY (HHS-HCC): Status: ACTIVE | Noted: 2024-06-19

## 2024-12-05 ENCOUNTER — APPOINTMENT (OUTPATIENT)
Dept: OBSTETRICS AND GYNECOLOGY | Facility: CLINIC | Age: 24
End: 2024-12-05
Payer: COMMERCIAL

## 2024-12-05 VITALS — DIASTOLIC BLOOD PRESSURE: 60 MMHG | WEIGHT: 125 LBS | BODY MASS INDEX: 23.62 KG/M2 | SYSTOLIC BLOOD PRESSURE: 102 MMHG

## 2024-12-05 DIAGNOSIS — O34.219 UTERINE SCAR FROM PREVIOUS CESAREAN DELIVERY COMPLICATING PREGNANCY (HHS-HCC): Primary | ICD-10-CM

## 2024-12-05 DIAGNOSIS — Z3A.32 32 WEEKS GESTATION OF PREGNANCY (HHS-HCC): ICD-10-CM

## 2024-12-05 PROCEDURE — 0501F PRENATAL FLOW SHEET: CPT | Performed by: OBSTETRICS & GYNECOLOGY

## 2024-12-05 NOTE — PROGRESS NOTES
Subjective   Patient ID [id number]   Marilou Kaufman is a 24 y.o.   at 32w2d with a working estimated date of delivery of 2025, by Last Menstrual Period who presents for a routine prenatal visit.   She desires RSV vaccine next visit due to mild URI symptoms.   Objective   Physical Exam  Weight: 56.7 kg (125 lb)  Expected Total Weight Gain: 11.5 kg (25 lb)-16 kg (35 lb)   Pregravid BMI: 18.53  BP: 102/60  Fetal Heart Rate: 140 Fundal Height (cm): 30 cm          Problem List Items Addressed This Visit       Uterine scar from previous  delivery complicating pregnancy ([facility]) - Primary    Overview     CS for breech 2023. She desires TOLAC  at [facility] for delivery given close spacing. Risk of uterine rupture is reviewed.         32 weeks gestation of pregnancy ([facility])    Overview     Desired provider in labor: [] CNM  [] Physician  [x] Blood Products: [x] Yes, accepts [] No, needs counseling  [x] Initial BMI: 18.53   [x] Prenatal Labs:   [x] Cervical Cancer Screening up to date  [x] Rh status: O pos  [x] Genetic Screening:  NIPS risk reducing.  [x] NT US: (11-13 wks)  [x] Baby ASA (if indicated):NA  [x] Pregnancy dated by: lmp    [x] Anatomy US: (19-20 wks)No malformations were identified on this comprehensive survey within limitations of sonographic evaluation at this gestational age.   [] Federal Sterilization consent signed (if indicated):  [x] 1hr GCT at 24-28wks: 64  [] Rhogam (if indicated): not indicated  [] Fetal Surveillance (if indicated):  [x] Tdap (27-32 wks, may be given up to 36 wks if initial window missed): 2024.  [] RSV (32-36 wks) (Sept. to end ):   [x] Flu Vaccine:2024    [] Breastfeeding:  [x] Postpartum Birth control method:   [] GBS at 36 - 37 wks:  [] 39 weeks discussion of IOL vs. Expectant management:  [] Mode of delivery ( anticipated ):               Will get EFW usn and confirm presentation.  Follow up as scheduled for a routine prenatal  visit.

## 2024-12-12 ENCOUNTER — HOSPITAL ENCOUNTER (OUTPATIENT)
Dept: RADIOLOGY | Facility: CLINIC | Age: 24
Discharge: HOME | End: 2024-12-12
Payer: COMMERCIAL

## 2024-12-12 DIAGNOSIS — O36.80X0 PREGNANCY WITH INCONCLUSIVE FETAL VIABILITY, SINGLE OR UNSPECIFIED FETUS: ICD-10-CM

## 2024-12-12 PROCEDURE — 76816 OB US FOLLOW-UP PER FETUS: CPT

## 2024-12-12 PROCEDURE — 76816 OB US FOLLOW-UP PER FETUS: CPT | Performed by: OBSTETRICS & GYNECOLOGY

## 2024-12-18 PROBLEM — Z3A.34 34 WEEKS GESTATION OF PREGNANCY (HHS-HCC): Status: ACTIVE | Noted: 2024-06-19

## 2024-12-19 ENCOUNTER — APPOINTMENT (OUTPATIENT)
Dept: OBSTETRICS AND GYNECOLOGY | Facility: CLINIC | Age: 24
End: 2024-12-19
Payer: COMMERCIAL

## 2024-12-19 VITALS — BODY MASS INDEX: 24.19 KG/M2 | WEIGHT: 128 LBS | SYSTOLIC BLOOD PRESSURE: 110 MMHG | DIASTOLIC BLOOD PRESSURE: 76 MMHG

## 2024-12-19 DIAGNOSIS — O34.219 UTERINE SCAR FROM PREVIOUS CESAREAN DELIVERY COMPLICATING PREGNANCY (HHS-HCC): Primary | ICD-10-CM

## 2024-12-19 DIAGNOSIS — Z3A.34 34 WEEKS GESTATION OF PREGNANCY (HHS-HCC): ICD-10-CM

## 2024-12-19 PROCEDURE — 0501F PRENATAL FLOW SHEET: CPT | Performed by: OBSTETRICS & GYNECOLOGY

## 2024-12-19 NOTE — PROGRESS NOTES
Subjective   Patient ID [id number]   Marilou Kaufman is a 24 y.o.   at 34w2d with a working estimated date of delivery of 2025, by Last Menstrual Period who presents for a routine prenatal visit.   She desires RSV vaccine once our supply is replenished.    Objective   Physical Exam  Weight: 58.1 kg (128 lb)  Expected Total Weight Gain: 11.5 kg (25 lb)-16 kg (35 lb)   Pregravid BMI: 18.53  BP: 110/76  Fetal Heart Rate: 145 Fundal Height (cm): 32 cm          Problem List Items Addressed This Visit       Uterine scar from previous  delivery complicating pregnancy ([facility]) - Primary    Overview     CS for breech 2023. She desires T[facility]  at [facility] for delivery given close spacing. Risk of uterine rupture is reviewed.  33 week EFW 2083g, 32%.          34 weeks gestation of pregnancy ([facility])    Overview     Desired provider in labor: [] CNM  [] Physician  [x] Blood Products: [x] Yes, accepts [] No, needs counseling  [x] Initial BMI: 18.53   [x] Prenatal Labs:   [x] Cervical Cancer Screening up to date  [x] Rh status: O pos  [x] Genetic Screening:  NIPS risk reducing.  [x] NT US: (11-13 wks)  [x] Baby ASA (if indicated):NA  [x] Pregnancy dated by: lmp    [x] Anatomy US: (19-20 wks)No malformations were identified on this comprehensive survey within limitations of sonographic evaluation at this gestational age.   [] Federal Sterilization consent signed (if indicated):  [x] 1hr GCT at 24-28wks: 64  [] Rhogam (if indicated): not indicated  [] Fetal Surveillance (if indicated):  [x] Tdap (27-32 wks, may be given up to 36 wks if initial window missed): 2024.  [] RSV (32-36 wks) (Sept. to end of ):   [x] Flu Vaccine:2024    [] Breastfeeding:  [x] Postpartum Birth control method:   [] GBS at 36 - 37 wks:  [] 39 weeks discussion of IOL vs. Expectant management:  [] Mode of delivery ( anticipated ):                 Follow up as scheduled for a routine prenatal visit.

## 2024-12-27 ENCOUNTER — CLINICAL SUPPORT (OUTPATIENT)
Dept: OBSTETRICS AND GYNECOLOGY | Facility: CLINIC | Age: 24
End: 2024-12-27
Payer: COMMERCIAL

## 2024-12-27 DIAGNOSIS — Z3A.34 34 WEEKS GESTATION OF PREGNANCY (HHS-HCC): Primary | ICD-10-CM

## 2024-12-27 PROCEDURE — 90678 RSV VACC PREF BIVALENT IM: CPT | Performed by: OBSTETRICS & GYNECOLOGY

## 2024-12-27 PROCEDURE — 90471 IMMUNIZATION ADMIN: CPT | Performed by: OBSTETRICS & GYNECOLOGY

## 2024-12-30 ENCOUNTER — APPOINTMENT (OUTPATIENT)
Dept: OBSTETRICS AND GYNECOLOGY | Facility: CLINIC | Age: 24
End: 2024-12-30
Payer: COMMERCIAL

## 2025-01-02 PROBLEM — Z3A.36 36 WEEKS GESTATION OF PREGNANCY (HHS-HCC): Status: ACTIVE | Noted: 2024-06-19

## 2025-01-03 ENCOUNTER — APPOINTMENT (OUTPATIENT)
Dept: OBSTETRICS AND GYNECOLOGY | Facility: CLINIC | Age: 25
End: 2025-01-03
Payer: COMMERCIAL

## 2025-01-03 VITALS — DIASTOLIC BLOOD PRESSURE: 86 MMHG | BODY MASS INDEX: 24.75 KG/M2 | WEIGHT: 131 LBS | SYSTOLIC BLOOD PRESSURE: 116 MMHG

## 2025-01-03 DIAGNOSIS — Z3A.36 36 WEEKS GESTATION OF PREGNANCY (HHS-HCC): ICD-10-CM

## 2025-01-03 DIAGNOSIS — O26.893 PREGNANCY RELATED HIP PAIN IN THIRD TRIMESTER, ANTEPARTUM (HHS-HCC): ICD-10-CM

## 2025-01-03 DIAGNOSIS — O34.219 UTERINE SCAR FROM PREVIOUS CESAREAN DELIVERY COMPLICATING PREGNANCY (HHS-HCC): Primary | ICD-10-CM

## 2025-01-03 DIAGNOSIS — M25.559 PREGNANCY RELATED HIP PAIN IN THIRD TRIMESTER, ANTEPARTUM (HHS-HCC): ICD-10-CM

## 2025-01-03 PROCEDURE — 0501F PRENATAL FLOW SHEET: CPT | Performed by: OBSTETRICS & GYNECOLOGY

## 2025-01-03 PROCEDURE — 87081 CULTURE SCREEN ONLY: CPT

## 2025-01-03 NOTE — PROGRESS NOTES
Subjective   Patient ID [id number]   Marilou Kaufman is a 24 y.o.   at 36w3d with a working estimated date of delivery of 2025, by Last Menstrual Period who presents for a routine prenatal visit.     Objective   Physical Exam  Weight: 59.4 kg (131 lb)  Expected Total Weight Gain: 11.5 kg (25 lb)-16 kg (35 lb)   Pregravid BMI: 18.53  BP: 116/86  Fetal Heart Rate: 145 Fundal Height (cm): 33 cm  Cl/40/-2        Problem List Items Addressed This Visit       Uterine scar from previous  delivery complicating pregnancy ([facility]) - Primary    Overview     CS for breech 2023. She [place]  [place] [facility] [place] given close spacing. Risk of uterine rupture is reviewed.  33 week EFW 2083g, 32%.          36 weeks gestation of pregnancy ([facility])    Overview     Desired provider in labor: [] CNM  [] Physician  [x] Blood Products: [x] Yes, accepts [] No, needs counseling  [x] Initial BMI: 18.53   [x] Prenatal Labs:   [x] Cervical Cancer Screening up to date  [x] Rh status: O pos  [x] Genetic Screening:  NIPS risk reducing.  [x] NT US: (11-13 wks)  [x] Baby ASA (if indicated):NA  [x] Pregnancy dated by: lmp    [x] Anatomy US: (19-20 wks)No malformations were identified on this comprehensive survey within limitations of sonographic evaluation at this gestational age.   [] Federal Sterilization consent signed (if indicated):  [x] 1hr GCT at 24-28wks: 64  [] Rhogam (if indicated): not indicated  [] Fetal Surveillance (if indicated):  [x] Tdap (27-32 wks, may be given up to 36 wks if initial window missed): 2024.  [x] RSV (32-36 wks) (Sept. to end of ): 24   [x] Flu Vaccine:2024    [] Breastfeeding:  [x] Postpartum Birth control method:   [] GBS at 36 - 37 wks:  [] 39 weeks discussion of IOL vs. Expectant management:  [] Mode of delivery ( anticipated ):           Relevant Orders    Group B Streptococcus (GBS) Prenatal Screen, Culture     Other Visit Diagnoses       Pregnancy  related hip pain in third trimester, antepartum ([facility])        Relevant Orders    Referral to Physical Therapy             GBBS is sent.  Follow up as scheduled for a routine prenatal visit.

## 2025-01-05 LAB — GP B STREP GENITAL QL CULT: NORMAL

## 2025-01-06 LAB — GP B STREP GENITAL QL CULT: ABNORMAL

## 2025-01-07 PROBLEM — O99.820 GROUP B STREPTOCOCCUS CARRIER STATE AFFECTING PREGNANCY: Status: ACTIVE | Noted: 2025-01-07

## 2025-01-08 PROBLEM — Z3A.37 37 WEEKS GESTATION OF PREGNANCY (HHS-HCC): Status: ACTIVE | Noted: 2024-06-19

## 2025-01-09 ENCOUNTER — APPOINTMENT (OUTPATIENT)
Dept: OBSTETRICS AND GYNECOLOGY | Facility: CLINIC | Age: 25
End: 2025-01-09
Payer: COMMERCIAL

## 2025-01-09 VITALS — WEIGHT: 132 LBS | SYSTOLIC BLOOD PRESSURE: 112 MMHG | DIASTOLIC BLOOD PRESSURE: 60 MMHG | BODY MASS INDEX: 24.94 KG/M2

## 2025-01-09 DIAGNOSIS — O99.820 GROUP B STREPTOCOCCUS CARRIER STATE AFFECTING PREGNANCY: ICD-10-CM

## 2025-01-09 DIAGNOSIS — O34.219 UTERINE SCAR FROM PREVIOUS CESAREAN DELIVERY COMPLICATING PREGNANCY (HHS-HCC): Primary | ICD-10-CM

## 2025-01-09 PROCEDURE — 0501F PRENATAL FLOW SHEET: CPT | Performed by: OBSTETRICS & GYNECOLOGY

## 2025-01-09 NOTE — PROGRESS NOTES
Subjective   Patient ID [id number]   Marilou Kaufman is a 24 y.o.   at 37w2d with a working estimated date of delivery of 2025, by Last Menstrual Period who presents for a routine prenatal visit.   We reviewed GBBS and plans for antibiotics at delivery.    Objective   Physical Exam  Weight: 59.9 kg (132 lb)  Expected Total Weight Gain: 11.5 kg (25 lb)-16 kg (35 lb)   Pregravid BMI: 18.53  BP: 112/60  Fetal Heart Rate: 145 Fundal Height (cm): 35 cm          Problem List Items Addressed This Visit       Uterine scar from previous  delivery complicating pregnancy ([facility]) - Primary    Overview     CS for breech 2023. She desires TOLAC  at [facility] for delivery given close spacing. Risk of uterine rupture is reviewed.  33 week EFW 2083g, 32%.          Group B Streptococcus carrier state affecting pregnancy    Overview     Plan antibiotics at delivery.               Follow up as scheduled for a routine prenatal visit.

## 2025-01-10 ENCOUNTER — EVALUATION (OUTPATIENT)
Dept: PHYSICAL THERAPY | Facility: CLINIC | Age: 25
End: 2025-01-10
Payer: COMMERCIAL

## 2025-01-10 DIAGNOSIS — O26.893 PREGNANCY RELATED HIP PAIN IN THIRD TRIMESTER, ANTEPARTUM (HHS-HCC): Primary | ICD-10-CM

## 2025-01-10 DIAGNOSIS — M25.559 PREGNANCY RELATED HIP PAIN IN THIRD TRIMESTER, ANTEPARTUM (HHS-HCC): Primary | ICD-10-CM

## 2025-01-10 PROCEDURE — 97161 PT EVAL LOW COMPLEX 20 MIN: CPT | Mod: GP

## 2025-01-10 PROCEDURE — 97535 SELF CARE MNGMENT TRAINING: CPT | Mod: GP

## 2025-01-10 ASSESSMENT — PATIENT HEALTH QUESTIONNAIRE - PHQ9
2. FEELING DOWN, DEPRESSED OR HOPELESS: NOT AT ALL
SUM OF ALL RESPONSES TO PHQ9 QUESTIONS 1 AND 2: 0
1. LITTLE INTEREST OR PLEASURE IN DOING THINGS: NOT AT ALL

## 2025-01-10 ASSESSMENT — ENCOUNTER SYMPTOMS
DEPRESSION: 0
LOSS OF SENSATION IN FEET: 0
OCCASIONAL FEELINGS OF UNSTEADINESS: 0

## 2025-01-10 ASSESSMENT — PAIN SCALES - GENERAL: PAINLEVEL_OUTOF10: 0 - NO PAIN

## 2025-01-10 ASSESSMENT — PAIN - FUNCTIONAL ASSESSMENT: PAIN_FUNCTIONAL_ASSESSMENT: 0-10

## 2025-01-10 NOTE — PROGRESS NOTES
Physical Therapy    EVALUATION AND TREATMENT    Name: Marilou Kaufman  MRN: [id number]  : 2000  Today's Date: 01/10/25     Time Calculation  Start Time: 919  Stop Time:   Time Calculation (min): 50 min    Assessment:    Pt presenting to clinic 37+3 days pregnant with 2nd child. Her first child was born 1 year ago via c section but planning on attempting a  for this pregnancy. She is endorsing some L groin discomfort at times, but primarily here for labor delivery and prep. A vaginal exam is deferred but will be performed if indicated and cleared by her OB gyn. Given recent post surgical and post partum status, anticipate that symptoms are stemming from poor force pelvic closure. Will continue to treat until delivery and follow up post partum.    Insurance:  Visit number: 1  Insurance Type: Medical [place] [employer] [place]  Approved # of visits: MN  Authorization Needed: no  Cert Date Ends On: n/a    Plan:   PT Plan: Skilled PT  PT Frequency: Other (Comment) (until delivery)  Rehab Potential: Excellent  Plan of Care Agreement: Patient  Planned interventions include: biofeedback, cryotherapy, education/instruction, electrical stimulation, gait training, home program, hot pack, kinesiotaping, manual therapy, neuromuscular re-education, self care/home management, therapeutic activities, and therapeutic exercises.   Access Code: [email or web address]  URL: [email or web address]  Date: 01/10/2025  Prepared by: Helen Samuel    Exercises  - Lateral Lunge Adductor Stretch with Counter Support  - 1 x daily - 7 x weekly - 2 sets - 10 reps - 5 seconds  hold  - Deep Squat with Pelvic Floor Relaxation  - 1 x daily - 7 x weekly - 2 sets - 5 reps - 15-20 seconds  hold  - Cat Cow  - 1 x daily - 7 x weekly - 2 sets - 10 reps - 5 seconds  hold  - Standing 'L' Stretch at Counter  - 1 x daily - 7 x weekly - 2 sets - 5 reps - 10 seconds hold  - Seated Hip Flexor Stretch  - 1 x daily - 7 x weekly - 2 sets - 10  reps - 5 seconds  hold  - Instruction in perineal massage 3 days per week     Current Problem:  1. Pregnancy related hip pain in third trimester, antepartum (HHS-HCC)  Referral to Physical Therapy    Follow Up In Physical Therapy          Subjective   General:  Reason for Referral: pelvic pain, labor prep  Referred By: Dr. Guadarrama  Chief complaint: pain with pregnancy due 2025   This started a few weeks ago, primarily on the left side with standing. She thinks she may have had it after her first delivery. This is sharp in nature. They are intermittent and unpredictable. Trying to wear a belt. Planning on a , no induction.     She had a c section 1 year ago (baby was breech), did not go through labor. Not currently breastfeeding.     Recently, had some pain with intercourse with deeper penetration. No pain in the past with pregnancies.       Precautions:  Precautions Comment: pregnancy     PMH: scoliosis  Fall Risk: no  Pain:  Pain Assessment: 0-10  0-10 (Numeric) Pain Score: 0 - No pain    Objective   Ortho Screen:  AROM:  Spinal flexion limited by pregnancy   Extension, SB, and rotation WFL   PROM/Joint Mobility:  Hip flexion WFL  Hip rotation WFL   Mild increases in tissue laxity   Strength:  Hip abduction >3/5 R/L   Special Tests:  Minimal FADDIR symptoms L  Palpation:  TTP over left inguinal area - concomitant symptom  TTP over adductor canal and proximal attachment   Observation:   Trunk and pregnancy angled to the right   SL stance:  Able to perform with slight pelvic drop R/L, no stepping strategy  Able to do full squat to floor      Outcome Measure:   NIH CPSI pain 6 NIH CPSI urinary 5 NIH CPSI quality of life 9     Careplan Goals:  1. Pt will be independent in HEP to maximize PT POC   2. Pt will be able to improve worst pain severity on NPRS by >2 points MCID   3. Pt will improve NIH CPSI by >50% raw score    Joelle Samuel, PT

## 2025-01-14 PROBLEM — Z3A.38 38 WEEKS GESTATION OF PREGNANCY (HHS-HCC): Status: ACTIVE | Noted: 2024-06-19

## 2025-01-15 ENCOUNTER — TREATMENT (OUTPATIENT)
Dept: PHYSICAL THERAPY | Facility: CLINIC | Age: 25
End: 2025-01-15
Payer: COMMERCIAL

## 2025-01-15 DIAGNOSIS — O26.893 PREGNANCY RELATED HIP PAIN IN THIRD TRIMESTER, ANTEPARTUM (HHS-HCC): ICD-10-CM

## 2025-01-15 DIAGNOSIS — M25.559 PREGNANCY RELATED HIP PAIN IN THIRD TRIMESTER, ANTEPARTUM (HHS-HCC): ICD-10-CM

## 2025-01-15 PROCEDURE — 97110 THERAPEUTIC EXERCISES: CPT | Mod: GP

## 2025-01-15 NOTE — PROGRESS NOTES
PHYSICAL THERAPY   TREATMENT NOTE    Patient Name:  Marilou Kaufman   Patient MRN: [id number]  Date: 01/15/25    Time Calculation  Start Time: 1506  Stop Time: 1539  Time Calculation (min): 33 min    Insurance:  Visit number: 2  Insurance Type: Medical [facility]  Approved # of visits: MN  Authorization Needed: no  Cert Date Ends On: n/a    General:  Reason for visit: pregnancy labor and delivery   Referred by: Dr. Guadarrama    Therapy diagnoses:   1. Pregnancy related hip pain in third trimester, antepartum (HHS-HCC)  Follow Up In Physical Therapy           Assessment:    Pt with slight increase in R SI joint discomfort but does come down at the end of treatment  Pain levels were unchanged at the end of the treatment.  Plan:  Continue pre delivery stretching and strengthening     Subjective:  She had some achiness in her left hip yesterday otherwise has been ok.  Pain (0-10): 0    Precautions:  PMH: scoliosis   Fall Risk: no     Objective:    Treatment Performed:   Therapeutic Exercise:  33 minutes  Sidelying clamshell R/ L x 10   Bicycles x 10   Sidelying hip abduction at 45 degrees   Figure 4 stretch modified 30 seconds x 4   Hooklying PF relaxation and contraction x 2 minutes   Deep squat with rocking lateral, anterior   Therapeutic Activity:   minutes     Self Care:   minutes    Manual Therapy:   minutes    Neuromuscular Re-education:   minutes    Gait Training:    minutes    Modalities:    minutes    Dry Needling:   minutes

## 2025-01-16 ENCOUNTER — LAB (OUTPATIENT)
Dept: LAB | Facility: LAB | Age: 25
End: 2025-01-16
Payer: COMMERCIAL

## 2025-01-16 ENCOUNTER — APPOINTMENT (OUTPATIENT)
Dept: OBSTETRICS AND GYNECOLOGY | Facility: CLINIC | Age: 25
End: 2025-01-16
Payer: COMMERCIAL

## 2025-01-16 VITALS — WEIGHT: 135 LBS | DIASTOLIC BLOOD PRESSURE: 60 MMHG | SYSTOLIC BLOOD PRESSURE: 110 MMHG | BODY MASS INDEX: 25.51 KG/M2

## 2025-01-16 DIAGNOSIS — O34.219 UTERINE SCAR FROM PREVIOUS CESAREAN DELIVERY COMPLICATING PREGNANCY (HHS-HCC): Primary | ICD-10-CM

## 2025-01-16 DIAGNOSIS — O99.713 PRURITUS OF PREGNANCY IN THIRD TRIMESTER (HHS-HCC): ICD-10-CM

## 2025-01-16 DIAGNOSIS — O99.820 GROUP B STREPTOCOCCUS CARRIER STATE AFFECTING PREGNANCY: ICD-10-CM

## 2025-01-16 DIAGNOSIS — L29.9 PRURITUS OF PREGNANCY IN THIRD TRIMESTER (HHS-HCC): ICD-10-CM

## 2025-01-16 DIAGNOSIS — Z3A.38 38 WEEKS GESTATION OF PREGNANCY (HHS-HCC): ICD-10-CM

## 2025-01-16 LAB
ALBUMIN SERPL BCP-MCNC: 3.8 G/DL (ref 3.4–5)
ALP SERPL-CCNC: 122 U/L (ref 33–110)
ALT SERPL W P-5'-P-CCNC: 9 U/L (ref 7–45)
ANION GAP SERPL CALC-SCNC: 9 MMOL/L (ref 10–20)
AST SERPL W P-5'-P-CCNC: 16 U/L (ref 9–39)
BILIRUB SERPL-MCNC: 0.4 MG/DL (ref 0–1.2)
BUN SERPL-MCNC: 7 MG/DL (ref 6–23)
CALCIUM SERPL-MCNC: 9.1 MG/DL (ref 8.6–10.3)
CHLORIDE SERPL-SCNC: 104 MMOL/L (ref 98–107)
CO2 SERPL-SCNC: 26 MMOL/L (ref 21–32)
CREAT SERPL-MCNC: 0.47 MG/DL (ref 0.5–1.05)
EGFRCR SERPLBLD CKD-EPI 2021: >90 ML/MIN/1.73M*2
GLUCOSE SERPL-MCNC: 89 MG/DL (ref 74–99)
POTASSIUM SERPL-SCNC: 4.1 MMOL/L (ref 3.5–5.3)
PROT SERPL-MCNC: 6.7 G/DL (ref 6.4–8.2)
SODIUM SERPL-SCNC: 135 MMOL/L (ref 136–145)

## 2025-01-16 PROCEDURE — 82239 BILE ACIDS TOTAL: CPT

## 2025-01-16 PROCEDURE — 0501F PRENATAL FLOW SHEET: CPT | Performed by: OBSTETRICS & GYNECOLOGY

## 2025-01-16 PROCEDURE — 80053 COMPREHEN METABOLIC PANEL: CPT

## 2025-01-16 PROCEDURE — 36415 COLL VENOUS BLD VENIPUNCTURE: CPT

## 2025-01-16 NOTE — PROGRESS NOTES
Subjective   Patient ID [id number]   Marilou Kaufman is a 24 y.o.   at 38w2d with a working estimated date of delivery of 2025, by Last Menstrual Period who presents for a routine prenatal visit. She notes intermittent itching of legs and feet. This is worse at night. We discussed checking labs including bile acids.     Objective   Physical Exam  Weight: 61.2 kg (135 lb)  Expected Total Weight Gain: 11.5 kg (25 lb)-16 kg (35 lb)   Pregravid BMI: 18.53  BP: 110/60  Fetal Heart Rate: 140 Fundal Height (cm): 36 cm  Ft/50/-2        Problem List Items Addressed This Visit       [facility] - [facility]    Overview     CS for breech 2023. She desires TOLAC  at [facility] for delivery given close spacing. Risk of uterine rupture is reviewed.  33 week EFW 2083g, 32%.          Group B Streptococcus carrier state affecting pregnancy    Overview     Plan antibiotics at delivery.         38 weeks gestation [facility])    Overview     Desired provider in labor: [] CNM  [] Physician  [x] Blood Products: [x] Yes, accepts [] No, needs counseling  [x] Initial BMI: 18.53   [x] Prenatal Labs:   [x] Cervical Cancer Screening up to date  [x] Rh status: O pos  [x] Genetic Screening:  NIPS risk reducing.  [x] NT US: (11-13 wks)  [x] Baby ASA (if indicated):NA  [x] Pregnancy dated by: lmp    [x] Anatomy US: (19-20 wks)No malformations were identified on this comprehensive survey within limitations of sonographic evaluation at this gestational age.   [] Federal Sterilization consent signed (if indicated):  [x] 1hr GCT at 24-28wks: 64  [] Rhogam (if indicated): not indicated  [] Fetal Surveillance (if indicated):  [x] Tdap (27-32 wks, may be given up to 36 wks if initial window missed): 2024.  [x] RSV (32-36 wks) (Sept. to end of ): 24   [x] Flu Vaccine:2024    [] Breastfeeding:  [x] Postpartum Birth control method:   [x] GBS at  36 - 37 wks: positive  [] 39 weeks discussion of IOL vs. Expectant management:  [] Mode of delivery ( anticipated ):            Other Visit Diagnoses       Pruritus of pregnancy in third trimester (HHS-HCC)        Relevant Orders    Comprehensive Metabolic Panel    Bile Acids, Total             She hopes to avoid induction of labor.   Follow up as scheduled for a routine prenatal visit.

## 2025-01-19 LAB — BILE AC SERPL-SCNC: 3 UMOL/L (ref 0–10)

## 2025-01-20 ENCOUNTER — TELEPHONE (OUTPATIENT)
Dept: OBSTETRICS AND GYNECOLOGY | Facility: CLINIC | Age: 25
End: 2025-01-20
Payer: COMMERCIAL

## 2025-01-20 NOTE — TELEPHONE ENCOUNTER
----- Message from Eugenia Guadarrama sent at 1/19/2025 11:54 AM EST -----  Lab is in normal range. If itching is progressing we can try hydroxyzine or benadryl.

## 2025-01-22 ENCOUNTER — TREATMENT (OUTPATIENT)
Dept: PHYSICAL THERAPY | Facility: CLINIC | Age: 25
End: 2025-01-22
Payer: COMMERCIAL

## 2025-01-22 DIAGNOSIS — O26.893 PREGNANCY RELATED HIP PAIN IN THIRD TRIMESTER, ANTEPARTUM (HHS-HCC): ICD-10-CM

## 2025-01-22 DIAGNOSIS — M25.559 PREGNANCY RELATED HIP PAIN IN THIRD TRIMESTER, ANTEPARTUM (HHS-HCC): ICD-10-CM

## 2025-01-22 PROBLEM — Z3A.39 39 WEEKS GESTATION OF PREGNANCY (HHS-HCC): Status: ACTIVE | Noted: 2024-06-19

## 2025-01-22 PROCEDURE — 97110 THERAPEUTIC EXERCISES: CPT | Mod: GP

## 2025-01-22 NOTE — PROGRESS NOTES
"PHYSICAL THERAPY   TREATMENT NOTE    Patient Name:  Marilou Kaufman   Patient MRN: [id number]  Date: 01/22/25    Time Calculation  Start Time: 1459  Stop Time: 1532  Time Calculation (min): 33 min  Pt arrived 15 minutes late     Insurance:  Visit number: 3  Insurance Type: Medical [facility]  Approved # of visits: MN  Authorization Needed: no  Cert Date Ends On: n/a    General:  Reason for visit: pregnancy labor and delivery   Referred by: Dr. Guadarrama    Therapy diagnoses:   1. Pregnancy related hip pain in third trimester, antepartum (HHS-HCC)  Follow Up In Physical Therapy             Assessment:    Patient has no increase in symptoms post treatment   Pain levels were unchanged at the end of the treatment.    Plan:  Continue pre delivery stretching and strengthening; if patient delivers will see her 6 weeks post partum.     Subjective:  She has maintenance of L hip pain, minor soreness after PT. PF has been hurting. She sees Dr. Guadarrama - currently 39+1  Pain (0-10): 0    Precautions:  PMH: scoliosis   Fall Risk: no     Objective:    Treatment Performed:   Therapeutic Exercise:  33 minutes  Peanut ball deep breathing x 5 minutes   \" \" Rocking   Adductor rocks R/L 2 x 10   Hamstring stretch 30 seconds x 6   Mini squat ball on the wall  2 x 10   Seated PF relaxation  Therapeutic Activity:   minutes     Self Care:   minutes    Manual Therapy:   minutes    Neuromuscular Re-education:   minutes    Gait Training:    minutes    Modalities:    minutes    Dry Needling:   minutes    "

## 2025-01-23 ENCOUNTER — APPOINTMENT (OUTPATIENT)
Dept: OBSTETRICS AND GYNECOLOGY | Facility: CLINIC | Age: 25
End: 2025-01-23
Payer: COMMERCIAL

## 2025-01-23 VITALS — WEIGHT: 136 LBS | DIASTOLIC BLOOD PRESSURE: 70 MMHG | BODY MASS INDEX: 25.7 KG/M2 | SYSTOLIC BLOOD PRESSURE: 102 MMHG

## 2025-01-23 DIAGNOSIS — O34.219 UTERINE SCAR FROM PREVIOUS CESAREAN DELIVERY COMPLICATING PREGNANCY (HHS-HCC): Primary | ICD-10-CM

## 2025-01-23 DIAGNOSIS — O99.820 GROUP B STREPTOCOCCUS CARRIER STATE AFFECTING PREGNANCY: ICD-10-CM

## 2025-01-23 DIAGNOSIS — Z3A.39 39 WEEKS GESTATION OF PREGNANCY (HHS-HCC): ICD-10-CM

## 2025-01-23 PROCEDURE — 0501F PRENATAL FLOW SHEET: CPT | Performed by: OBSTETRICS & GYNECOLOGY

## 2025-01-23 NOTE — PROGRESS NOTES
Subjective   Patient ID [id number]   Marilou Kaufman is a 24 y.o.   at 39w2d with a working estimated date of delivery of 2025, by Last Menstrual Period who presents for a routine prenatal visit.     Objective   Physical Exam  Weight: 61.7 kg (136 lb)  Expected Total Weight Gain: 11.5 kg (25 lb)-16 kg (35 lb)   Pregravid BMI: 18.53  BP: 102/70  Fetal Heart Rate: 145 Fundal Height (cm): 36 cm  Ft/50/ballotable  Suspect occiput posterior on [place].      Problem List Items Addressed This Visit       Uterine scar from previous  delivery complicating pregnancy ([facility]) - Primary    Overview     CS for breech 2023. She desires TOLAC  at [facility] for delivery given close spacing. Risk of uterine rupture is reviewed.  33 week EFW 2083g, 32%.          Group B Streptococcus carrier state affecting pregnancy    Overview     Plan antibiotics at delivery.         39 weeks gestation of pregnancy ([facility])    Overview     Desired provider in labor: [] CNM  [] Physician  [x] Blood Products: [x] Yes, accepts [] No, needs counseling  [x] Initial BMI: 18.53   [x] Prenatal Labs:   [x] Cervical Cancer Screening up to date  [x] Rh status: O pos  [x] Genetic Screening:  NIPS risk reducing.  [x] NT US: (11-13 wks)  [x] Baby ASA (if indicated):NA  [x] Pregnancy dated by: lmp    [x] Anatomy US: (19-20 wks)No malformations were identified on this comprehensive survey within limitations of sonographic evaluation at this gestational age.   [] Federal Sterilization consent signed (if indicated):  [x] 1hr GCT at 24-28wks: 64  [] Rhogam (if indicated): not indicated  [] Fetal Surveillance (if indicated):  [x] Tdap (27-32 wks, may be given up to 36 wks if initial window missed): 2024.  [x] RSV (32-36 wks) (Sept. to end of ): 24   [x] Flu Vaccine:2024    [] Breastfeeding:  [x] Postpartum Birth control method:   [x] GBS at 36 - 37 wks: positive  [] 39 weeks discussion of IOL vs. Expectant  management:  [] Mode of delivery ( anticipated ):               Stretches and exercises were reviewed to encourage fetal rotation and descent.   Follow up as scheduled for a routine prenatal visit.

## 2025-01-27 ENCOUNTER — APPOINTMENT (OUTPATIENT)
Dept: PHYSICAL THERAPY | Facility: CLINIC | Age: 25
End: 2025-01-27
Payer: COMMERCIAL

## 2025-01-27 PROBLEM — Z3A.40 40 WEEKS GESTATION OF PREGNANCY (HHS-HCC): Status: ACTIVE | Noted: 2024-06-19

## 2025-01-29 ENCOUNTER — APPOINTMENT (OUTPATIENT)
Dept: OBSTETRICS AND GYNECOLOGY | Facility: CLINIC | Age: 25
End: 2025-01-29
Payer: COMMERCIAL

## 2025-01-29 VITALS — WEIGHT: 138 LBS | DIASTOLIC BLOOD PRESSURE: 76 MMHG | SYSTOLIC BLOOD PRESSURE: 110 MMHG | BODY MASS INDEX: 26.07 KG/M2

## 2025-01-29 DIAGNOSIS — Z3A.40 40 WEEKS GESTATION OF PREGNANCY (HHS-HCC): ICD-10-CM

## 2025-01-29 DIAGNOSIS — O34.219 UTERINE SCAR FROM PREVIOUS CESAREAN DELIVERY COMPLICATING PREGNANCY (HHS-HCC): ICD-10-CM

## 2025-01-29 DIAGNOSIS — O99.820 GROUP B STREPTOCOCCUS CARRIER STATE AFFECTING PREGNANCY: Primary | ICD-10-CM

## 2025-01-29 PROCEDURE — 0501F PRENATAL FLOW SHEET: CPT | Performed by: OBSTETRICS & GYNECOLOGY

## 2025-01-29 NOTE — PROGRESS NOTES
Subjective   Patient ID [id number]   Marilou Kaufman is a 24 y.o.   at 40w1d with a working estimated date of delivery of 2025, by Last Menstrual Period who presents for a routine prenatal visit.     Objective   Physical Exam  Weight: 62.6 kg (138 lb)  Expected Total Weight Gain: 11.5 kg (25 lb)-16 kg (35 lb)   Pregravid BMI: 18.53  BP: 110/76  Fetal Heart Rate: 145 Fundal Height (cm): 35 cm    / cephalic      Problem List Items Addressed This Visit       Uterine scar from previous  delivery complicating pregnancy ([facility])    Overview     CS for breech 2023. She desires TOLAC  at [facility] for delivery given close spacing. Risk of uterine rupture is reviewed.  33 week EFW 2083g, 32%.   Induction is scheduled for 2/3/2- at 40.6 weeks.          Relevant Orders    Labor Induction    Group B Streptococcus carrier state affecting pregnancy - Primary    Overview     Plan antibiotics at delivery.         40 weeks gestation of pregnancy ([facility])    Overview     Desired provider in labor: [] CNM  [] Physician  [x] Blood Products: [x] Yes, accepts [] No, needs counseling  [x] Initial BMI: 18.53   [x] Prenatal Labs:   [x] Cervical Cancer Screening up to date  [x] Rh status: O pos  [x] Genetic Screening:  NIPS risk reducing.  [x] NT US: (11-13 wks)  [x] Baby ASA (if indicated):NA  [x] Pregnancy dated by: lmp    [x] Anatomy US: (19-20 wks)No malformations were identified on this comprehensive survey within limitations of sonographic evaluation at this gestational age.   [] Federal Sterilization consent signed (if indicated):  [x] 1hr GCT at 24-28wks: 64  [] Rhogam (if indicated): not indicated  [] Fetal Surveillance (if indicated):  [x] Tdap (27-32 wks, may be given up to 36 wks if initial window missed): 2024.  [x] RSV (32-36 wks) (Sept. to end of ): 24   [x] Flu Vaccine:2024    [] Breastfeeding:  [x] Postpartum Birth control method:   [x] GBS at 36 - 37 wks:  positive  [] 39 weeks discussion of IOL vs. Expectant management:  [] Mode of delivery ( anticipated ):           Relevant Orders    Labor Induction        She is willing to proceed with induction of labor at [facility] due to TOLAC and closely spaced pregnancies.  Follow up as scheduled for a routine prenatal visit.

## 2025-01-30 ENCOUNTER — APPOINTMENT (OUTPATIENT)
Dept: OBSTETRICS AND GYNECOLOGY | Facility: CLINIC | Age: 25
End: 2025-01-30
Payer: COMMERCIAL

## 2025-02-03 ENCOUNTER — ANESTHESIA EVENT (OUTPATIENT)
Dept: OBSTETRICS AND GYNECOLOGY | Facility: HOSPITAL | Age: 25
End: 2025-02-03
Payer: COMMERCIAL

## 2025-02-03 ENCOUNTER — HOSPITAL ENCOUNTER (INPATIENT)
Facility: HOSPITAL | Age: 25
LOS: 3 days | Discharge: HOME | End: 2025-02-06
Attending: SPECIALIST | Admitting: ADVANCED PRACTICE MIDWIFE
Payer: COMMERCIAL

## 2025-02-03 ENCOUNTER — APPOINTMENT (OUTPATIENT)
Dept: OBSTETRICS AND GYNECOLOGY | Facility: HOSPITAL | Age: 25
End: 2025-02-03
Payer: COMMERCIAL

## 2025-02-03 ENCOUNTER — ANESTHESIA (OUTPATIENT)
Dept: OBSTETRICS AND GYNECOLOGY | Facility: HOSPITAL | Age: 25
End: 2025-02-03
Payer: COMMERCIAL

## 2025-02-03 DIAGNOSIS — O34.219 UTERINE SCAR FROM PREVIOUS CESAREAN DELIVERY COMPLICATING PREGNANCY (HHS-HCC): ICD-10-CM

## 2025-02-03 DIAGNOSIS — Z3A.40 40 WEEKS GESTATION OF PREGNANCY (HHS-HCC): ICD-10-CM

## 2025-02-03 PROBLEM — Z34.90 ENCOUNTER FOR PLANNED INDUCTION OF LABOR: Status: ACTIVE | Noted: 2025-02-03

## 2025-02-03 LAB
ABO GROUP (TYPE) IN BLOOD: NORMAL
ANTIBODY SCREEN: NORMAL
ERYTHROCYTE [DISTWIDTH] IN BLOOD BY AUTOMATED COUNT: 13 % (ref 11.5–14.5)
HCT VFR BLD AUTO: 34.8 % (ref 36–46)
HGB BLD-MCNC: 11.4 G/DL (ref 12–16)
MCH RBC QN AUTO: 27.1 PG (ref 26–34)
MCHC RBC AUTO-ENTMCNC: 32.8 G/DL (ref 32–36)
MCV RBC AUTO: 83 FL (ref 80–100)
NRBC BLD-RTO: 0 /100 WBCS (ref 0–0)
PLATELET # BLD AUTO: 346 X10*3/UL (ref 150–450)
RBC # BLD AUTO: 4.2 X10*6/UL (ref 4–5.2)
RH FACTOR (ANTIGEN D): NORMAL
TREPONEMA PALLIDUM IGG+IGM AB [PRESENCE] IN SERUM OR PLASMA BY IMMUNOASSAY: NONREACTIVE
WBC # BLD AUTO: 10 X10*3/UL (ref 4.4–11.3)

## 2025-02-03 PROCEDURE — 85027 COMPLETE CBC AUTOMATED: CPT

## 2025-02-03 PROCEDURE — 59050 FETAL MONITOR W/REPORT: CPT

## 2025-02-03 PROCEDURE — 2720000007 HC OR 272 NO HCPCS

## 2025-02-03 PROCEDURE — 2500000004 HC RX 250 GENERAL PHARMACY W/ HCPCS (ALT 636 FOR OP/ED): Performed by: ADVANCED PRACTICE MIDWIFE

## 2025-02-03 PROCEDURE — 7210000002 HC LABOR PER HOUR

## 2025-02-03 PROCEDURE — 86900 BLOOD TYPING SEROLOGIC ABO: CPT

## 2025-02-03 PROCEDURE — 1120000001 HC OB PRIVATE ROOM DAILY

## 2025-02-03 PROCEDURE — 86780 TREPONEMA PALLIDUM: CPT

## 2025-02-03 PROCEDURE — 36415 COLL VENOUS BLD VENIPUNCTURE: CPT

## 2025-02-03 PROCEDURE — 86923 COMPATIBILITY TEST ELECTRIC: CPT

## 2025-02-03 PROCEDURE — 2500000004 HC RX 250 GENERAL PHARMACY W/ HCPCS (ALT 636 FOR OP/ED)

## 2025-02-03 PROCEDURE — 86901 BLOOD TYPING SEROLOGIC RH(D): CPT

## 2025-02-03 PROCEDURE — 99222 1ST HOSP IP/OBS MODERATE 55: CPT

## 2025-02-03 RX ORDER — HYDRALAZINE HYDROCHLORIDE 20 MG/ML
5 INJECTION INTRAMUSCULAR; INTRAVENOUS ONCE AS NEEDED
Status: DISCONTINUED | OUTPATIENT
Start: 2025-02-03 | End: 2025-02-04 | Stop reason: SDUPTHER

## 2025-02-03 RX ORDER — LOPERAMIDE HYDROCHLORIDE 2 MG/1
4 CAPSULE ORAL EVERY 2 HOUR PRN
Status: DISCONTINUED | OUTPATIENT
Start: 2025-02-03 | End: 2025-02-04 | Stop reason: SDUPTHER

## 2025-02-03 RX ORDER — OXYTOCIN 10 [USP'U]/ML
10 INJECTION, SOLUTION INTRAMUSCULAR; INTRAVENOUS ONCE AS NEEDED
Status: DISCONTINUED | OUTPATIENT
Start: 2025-02-03 | End: 2025-02-04 | Stop reason: SDUPTHER

## 2025-02-03 RX ORDER — OXYTOCIN/0.9 % SODIUM CHLORIDE 30/500 ML
60 PLASTIC BAG, INJECTION (ML) INTRAVENOUS ONCE AS NEEDED
Status: DISCONTINUED | OUTPATIENT
Start: 2025-02-03 | End: 2025-02-04 | Stop reason: SDUPTHER

## 2025-02-03 RX ORDER — MISOPROSTOL 200 UG/1
800 TABLET ORAL ONCE AS NEEDED
Status: DISCONTINUED | OUTPATIENT
Start: 2025-02-03 | End: 2025-02-04 | Stop reason: SDUPTHER

## 2025-02-03 RX ORDER — ONDANSETRON 4 MG/1
4 TABLET, FILM COATED ORAL EVERY 6 HOURS PRN
Status: DISCONTINUED | OUTPATIENT
Start: 2025-02-03 | End: 2025-02-04 | Stop reason: SDUPTHER

## 2025-02-03 RX ORDER — ONDANSETRON HYDROCHLORIDE 2 MG/ML
4 INJECTION, SOLUTION INTRAVENOUS EVERY 6 HOURS PRN
Status: DISCONTINUED | OUTPATIENT
Start: 2025-02-03 | End: 2025-02-04 | Stop reason: SDUPTHER

## 2025-02-03 RX ORDER — CARBOPROST TROMETHAMINE 250 UG/ML
250 INJECTION, SOLUTION INTRAMUSCULAR ONCE AS NEEDED
Status: DISCONTINUED | OUTPATIENT
Start: 2025-02-03 | End: 2025-02-04 | Stop reason: SDUPTHER

## 2025-02-03 RX ORDER — METHYLERGONOVINE MALEATE 0.2 MG/ML
0.2 INJECTION INTRAVENOUS ONCE AS NEEDED
Status: DISCONTINUED | OUTPATIENT
Start: 2025-02-03 | End: 2025-02-04 | Stop reason: SDUPTHER

## 2025-02-03 RX ORDER — OXYTOCIN/0.9 % SODIUM CHLORIDE 30/500 ML
2-30 PLASTIC BAG, INJECTION (ML) INTRAVENOUS CONTINUOUS
Status: DISCONTINUED | OUTPATIENT
Start: 2025-02-03 | End: 2025-02-04

## 2025-02-03 RX ORDER — SODIUM CHLORIDE, SODIUM LACTATE, POTASSIUM CHLORIDE, CALCIUM CHLORIDE 600; 310; 30; 20 MG/100ML; MG/100ML; MG/100ML; MG/100ML
75 INJECTION, SOLUTION INTRAVENOUS CONTINUOUS
Status: DISCONTINUED | OUTPATIENT
Start: 2025-02-03 | End: 2025-02-04

## 2025-02-03 RX ORDER — LABETALOL HYDROCHLORIDE 5 MG/ML
20 INJECTION, SOLUTION INTRAVENOUS ONCE AS NEEDED
Status: DISCONTINUED | OUTPATIENT
Start: 2025-02-03 | End: 2025-02-04 | Stop reason: SDUPTHER

## 2025-02-03 RX ORDER — NIFEDIPINE 10 MG/1
10 CAPSULE ORAL ONCE AS NEEDED
Status: DISCONTINUED | OUTPATIENT
Start: 2025-02-03 | End: 2025-02-04 | Stop reason: SDUPTHER

## 2025-02-03 RX ORDER — TERBUTALINE SULFATE 1 MG/ML
0.25 INJECTION SUBCUTANEOUS ONCE AS NEEDED
Status: DISCONTINUED | OUTPATIENT
Start: 2025-02-03 | End: 2025-02-04 | Stop reason: SDUPTHER

## 2025-02-03 RX ORDER — OXYTOCIN/0.9 % SODIUM CHLORIDE 30/500 ML
60 PLASTIC BAG, INJECTION (ML) INTRAVENOUS ONCE AS NEEDED
Status: COMPLETED | OUTPATIENT
Start: 2025-02-03 | End: 2025-02-04

## 2025-02-03 RX ORDER — PENICILLIN G 3000000 [IU]/50ML
3 INJECTION, SOLUTION INTRAVENOUS EVERY 4 HOURS
Status: DISCONTINUED | OUTPATIENT
Start: 2025-02-03 | End: 2025-02-04 | Stop reason: SDUPTHER

## 2025-02-03 RX ORDER — LIDOCAINE HYDROCHLORIDE 10 MG/ML
30 INJECTION, SOLUTION INFILTRATION; PERINEURAL ONCE AS NEEDED
Status: DISCONTINUED | OUTPATIENT
Start: 2025-02-03 | End: 2025-02-04 | Stop reason: SDUPTHER

## 2025-02-03 RX ORDER — TRANEXAMIC ACID 100 MG/ML
1000 INJECTION, SOLUTION INTRAVENOUS ONCE AS NEEDED
Status: DISCONTINUED | OUTPATIENT
Start: 2025-02-03 | End: 2025-02-04 | Stop reason: SDUPTHER

## 2025-02-03 RX ORDER — FENTANYL/ROPIVACAINE/NS/PF 2MCG/ML-.2
0-25 PLASTIC BAG, INJECTION (ML) INJECTION CONTINUOUS
Status: DISCONTINUED | OUTPATIENT
Start: 2025-02-03 | End: 2025-02-04

## 2025-02-03 RX ADMIN — SODIUM CHLORIDE, POTASSIUM CHLORIDE, SODIUM LACTATE AND CALCIUM CHLORIDE 75 ML/HR: 600; 310; 30; 20 INJECTION, SOLUTION INTRAVENOUS at 16:44

## 2025-02-03 RX ADMIN — PENICILLIN G POTASSIUM 5 MILLION UNITS: 5000000 INJECTION, POWDER, FOR SOLUTION INTRAMUSCULAR; INTRAVENOUS at 16:44

## 2025-02-03 RX ADMIN — Medication 2 MILLI-UNITS/MIN: at 22:30

## 2025-02-03 RX ADMIN — PENICILLIN G 3 MILLION UNITS: 3000000 INJECTION, SOLUTION INTRAVENOUS at 20:23

## 2025-02-03 SDOH — SOCIAL STABILITY: SOCIAL INSECURITY: DOES ANYONE TRY TO KEEP YOU FROM HAVING/CONTACTING OTHER FRIENDS OR DOING THINGS OUTSIDE YOUR HOME?: NO

## 2025-02-03 SDOH — SOCIAL STABILITY: SOCIAL INSECURITY: WITHIN THE LAST YEAR, HAVE YOU BEEN HUMILIATED OR EMOTIONALLY ABUSED IN OTHER WAYS BY YOUR PARTNER OR EX-PARTNER?: NO

## 2025-02-03 SDOH — ECONOMIC STABILITY: FOOD INSECURITY: WITHIN THE PAST 12 MONTHS, THE FOOD YOU BOUGHT JUST DIDN'T LAST AND YOU DIDN'T HAVE MONEY TO GET MORE.: SOMETIMES TRUE

## 2025-02-03 SDOH — SOCIAL STABILITY: SOCIAL INSECURITY
WITHIN THE LAST YEAR, HAVE YOU BEEN KICKED, HIT, SLAPPED, OR OTHERWISE PHYSICALLY HURT BY YOUR PARTNER OR EX-PARTNER?: NO

## 2025-02-03 SDOH — HEALTH STABILITY: MENTAL HEALTH: SUICIDAL BEHAVIOR (LIFETIME): NO

## 2025-02-03 SDOH — SOCIAL STABILITY: SOCIAL INSECURITY: VERBAL ABUSE: DENIES

## 2025-02-03 SDOH — HEALTH STABILITY: MENTAL HEALTH: NON-SPECIFIC ACTIVE SUICIDAL THOUGHTS (PAST 1 MONTH): NO

## 2025-02-03 SDOH — SOCIAL STABILITY: SOCIAL INSECURITY: ARE YOU OR HAVE YOU BEEN THREATENED OR ABUSED PHYSICALLY, EMOTIONALLY, OR SEXUALLY BY ANYONE?: NO

## 2025-02-03 SDOH — SOCIAL STABILITY: SOCIAL INSECURITY: WITHIN THE LAST YEAR, HAVE YOU BEEN AFRAID OF YOUR PARTNER OR EX-PARTNER?: NO

## 2025-02-03 SDOH — HEALTH STABILITY: MENTAL HEALTH: WISH TO BE DEAD (PAST 1 MONTH): NO

## 2025-02-03 SDOH — SOCIAL STABILITY: SOCIAL INSECURITY: DO YOU FEEL ANYONE HAS EXPLOITED OR TAKEN ADVANTAGE OF YOU FINANCIALLY OR OF YOUR PERSONAL PROPERTY?: NO

## 2025-02-03 SDOH — SOCIAL STABILITY: SOCIAL INSECURITY
WITHIN THE LAST YEAR, HAVE YOU BEEN RAPED OR FORCED TO HAVE ANY KIND OF SEXUAL ACTIVITY BY YOUR PARTNER OR EX-PARTNER?: NO

## 2025-02-03 SDOH — ECONOMIC STABILITY: HOUSING INSECURITY: DO YOU FEEL UNSAFE GOING BACK TO THE PLACE WHERE YOU ARE LIVING?: NO

## 2025-02-03 SDOH — SOCIAL STABILITY: SOCIAL INSECURITY: ARE THERE ANY APPARENT SIGNS OF INJURIES/BEHAVIORS THAT COULD BE RELATED TO ABUSE/NEGLECT?: NO

## 2025-02-03 SDOH — SOCIAL STABILITY: SOCIAL INSECURITY: PHYSICAL ABUSE: DENIES

## 2025-02-03 SDOH — HEALTH STABILITY: MENTAL HEALTH: CURRENT SMOKER: 0

## 2025-02-03 SDOH — SOCIAL STABILITY: SOCIAL INSECURITY: HAVE YOU HAD ANY THOUGHTS OF HARMING ANYONE ELSE?: NO

## 2025-02-03 SDOH — ECONOMIC STABILITY: FOOD INSECURITY
WITHIN THE PAST 12 MONTHS, YOU WORRIED THAT YOUR FOOD WOULD RUN OUT BEFORE YOU GOT THE MONEY TO BUY MORE.: SOMETIMES TRUE

## 2025-02-03 SDOH — HEALTH STABILITY: MENTAL HEALTH: WERE YOU ABLE TO COMPLETE ALL THE BEHAVIORAL HEALTH SCREENINGS?: YES

## 2025-02-03 SDOH — SOCIAL STABILITY: SOCIAL INSECURITY: HAVE YOU HAD THOUGHTS OF HARMING ANYONE ELSE?: NO

## 2025-02-03 SDOH — SOCIAL STABILITY: SOCIAL INSECURITY: HAS ANYONE EVER THREATENED TO HURT YOUR FAMILY OR YOUR PETS?: NO

## 2025-02-03 SDOH — SOCIAL STABILITY: SOCIAL INSECURITY: ABUSE SCREEN: ADULT

## 2025-02-03 ASSESSMENT — PAIN SCALES - GENERAL
PAINLEVEL_OUTOF10: 0 - NO PAIN

## 2025-02-03 ASSESSMENT — PATIENT HEALTH QUESTIONNAIRE - PHQ9
SUM OF ALL RESPONSES TO PHQ9 QUESTIONS 1 & 2: 0
1. LITTLE INTEREST OR PLEASURE IN DOING THINGS: NOT AT ALL
2. FEELING DOWN, DEPRESSED OR HOPELESS: NOT AT ALL

## 2025-02-03 ASSESSMENT — ACTIVITIES OF DAILY LIVING (ADL): LACK_OF_TRANSPORTATION: NO

## 2025-02-03 ASSESSMENT — LIFESTYLE VARIABLES
HOW OFTEN DO YOU HAVE 6 OR MORE DRINKS ON ONE OCCASION: NEVER
HOW MANY STANDARD DRINKS CONTAINING ALCOHOL DO YOU HAVE ON A TYPICAL DAY: PATIENT DOES NOT DRINK
AUDIT-C TOTAL SCORE: 0
SKIP TO QUESTIONS 9-10: 1
HOW OFTEN DO YOU HAVE A DRINK CONTAINING ALCOHOL: NEVER
AUDIT-C TOTAL SCORE: 0

## 2025-02-03 NOTE — H&P
OB Admission H&P    Assessment/Plan    Marilou Kaufman is a 24 y.o.  at 40w6d, CATHIE: 2025, by Last Menstrual Period, who presents for TOLAC Induction of Labor.    TOLAC IOL  -MFMU 75.5%  - in G1 for breech presentation  -Reviewed the risks of TOLAC including the risk of uterine rupture of ~0.5-0.9% and a 10-15% risk of adverse maternal or  outcome in the context of rupture. Pt verbalizes understanding, desires to proceed with TOLAC.  -T&C x1 unit    -Admit to L&D, consented  -Birth wishes reviewed  -T&S, CBC, and Syphilis  -Plan to initiate induction with CRB, avoid use of cytotec  -Plans NCB, epidural at patient request  -Recheck as clinically indicated by maternal or fetal status    Fetal Status  -NST reactive, reassuring   -Presentation cephalic based on ultrasound  -EFW 6#8oz by Leopold's  -GBS positive, for PCN intrapartum    Postpartum  Contraception Plan:  Declines    Melissa L Zahorsky, APRN-CNM      Pregnancy Problems (from 24 to present)       Problem Noted Diagnosed Resolved    Encounter for planned induction of labor 2/3/2025 by Melissa L Zahorsky, APRN-CNM  No    Priority:  Medium       Pruritus of pregnancy in third trimester ([facility]) 2025 by Eugenia Guadarrama MD  No    Priority:  Medium       Overview Signed 2025  3:20 PM by Eugenia Guadarrama MD     Itching of legs and feet. CMP and bile acids are ordered.          Group B Streptococcus carrier state affecting pregnancy 2025 by Eugenia Guadarrama MD  No    Priority:  Medium       Overview Signed 2025 10:58 AM by Eugenia Guadarrama MD     Plan antibiotics at delivery.         40 weeks gestation of pregnancy ([facility]) 2024 by Eugenia Guadarrama MD  No    Priority:  Medium       Overview Addendum 2025  3:43 PM by Oralia Crane, RN     Desired provider in labor: [] CNM  [] Physician  [x] Blood Products: [x] Yes, accepts [] No, needs counseling  [x] Initial BMI: 18.53    [x] Prenatal Labs:   [x] Cervical Cancer Screening up to date  [x] Rh status: O pos  [x] Genetic Screening:  NIPS risk reducing.  [x] NT US: (11-13 wks)  [x] Baby ASA (if indicated):NA  [x] Pregnancy dated by: lmp    [x] Anatomy US: (19-20 wks)No malformations were identified on this comprehensive survey within limitations of sonographic evaluation at this gestational age.   [] Federal Sterilization consent signed (if indicated):  [x] 1hr GCT at 24-28wks: 64  [] Rhogam (if indicated): not indicated  [] Fetal Surveillance (if indicated):  [x] Tdap (27-32 wks, may be given up to 36 wks if initial window missed): 2024.  [x] RSV (32-36 wks) (Sept. to end of ): 24   [x] Flu Vaccine:2024    [] Breastfeeding:  [x] Postpartum Birth control method:   [x] GBS at 36 - 37 wks: positive  [] 39 weeks discussion of IOL vs. Expectant management:  [] Mode of delivery ( anticipated ):           Young multigravida in third trimester ([facility]) 2024 by Eugenia Guadarrama MD  No    Priority:  Medium       Overview Addendum 2024 12:49 PM by Eugenia Guadarrama MD     NIPS returned risk reducing.  Carrier screening returned negative.                  Kelsey Zamarripa is a 24 y.o.  at 40w6d, CATHIE: 2025, by LMP, c/w 7w4d US, who presents for TOLAC Induction of Labor. She reports good fetal movement. Denies vaginal bleeding. Denies contractions. Denies leaking of fluid.      Prenatal Provider MD Thierry    OB History    Para Term  AB Living   2 1 1 0 0 1   SAB IAB Ectopic Multiple Live Births   0 0 0 0 1      # Outcome Date GA Lbr Ankur/2nd Weight Sex Type Anes PTL Lv   2 Current            1 Term 24 39w2d  2.98 kg F CS-LTranv Spinal  NUHA      Name: GAETANO ESTRELLA      Apgar1: 9  Apgar5: 9       Past Surgical History:   Procedure Laterality Date     SECTION, LOW TRANSVERSE      SKIN BIOPSY         Social History     Tobacco Use    Smoking status:  Never    Smokeless tobacco: Never   Substance Use Topics    Alcohol use: Not Currently       No Known Allergies    Medications Prior to Admission   Medication Sig Dispense Refill Last Dose/Taking    famotidine (Pepcid) 20 mg tablet Take 1 tablet (20 mg) by mouth 2 times a day. 180 tablet 3 2/3/2025 Morning    prenatal vitamin, iron-folic, 27 mg iron-800 mcg folic acid tablet Take 1 tablet by mouth once daily. 90 tablet 3 2/2/2025 Morning     Objective     Last Vitals  Temp Pulse Resp BP MAP O2 Sat   37.2 °C (99 °F) 109 18 118/81 95 98 %     Blood Pressures         2/3/2025  1405             BP: 118/81             Physical Exam  General: NAD, mood appropriate  Cardiopulmonary: warm and well perfused, breathing comfortably on room air  Abdomen: Gravid, non-tender  Extremities: Symmetric  Speculum Exam: deferred  Cervix: 1/50/-3/medium/posterior     Fetal Monitoring  Baseline: 135 bpm, Variability: moderate,  Accelerations: present and Decelerations: none  [place]: irritability  Interpretation: Reactive    Bedside ultrasound: Yes, performed by Dr. Huerta    Labs in chart were reviewed.        Prenatal labs reviewed, remarkable for GBS positive.

## 2025-02-03 NOTE — ANESTHESIA PREPROCEDURE EVALUATION
Patient: Marilou Salazar [place]    Evaluation Method: In-person visit    Procedure Information    Date: 25  Procedure: Labor Consult         Relevant Problems   Musculoskeletal   (+) Scoliosis      GYN   (+) 40 weeks gestation of pregnancy ([facility])   (+) Young multigravida in third trimester ([facility])       Clinical information reviewed:    Allergies  Meds               NPO Detail:  No data recorded     OB/Gyn Evaluation    Present Pregnancy    Patient is pregnant now.  (+) , previous  section (for Breech position)   Obstetric History    (+)  section              Physical Exam    Airway  Mallampati: II  TM distance: >3 FB  Neck ROM: full     Cardiovascular   Rhythm: regular  Rate: abnormal     Dental    Pulmonary    Abdominal            Anesthesia Plan    History of general anesthesia?: no  History of complications of general anesthesia?: no    ASA 2     epidural     The patient is not a current smoker.    Anesthetic plan and risks discussed with patient.  Use of blood products discussed with patient who consented to blood products.    Plan discussed with resident.

## 2025-02-03 NOTE — SIGNIFICANT EVENT
CRB inserted through cervical os and inflated with 60cc saline.   Placement of balloon confirmed with gentle traction and visualization.   Patient tolerated well.    Melissa L Zahorsky, APRN-BALJEETM

## 2025-02-04 ENCOUNTER — ANESTHESIA (OUTPATIENT)
Dept: OBSTETRICS AND GYNECOLOGY | Facility: HOSPITAL | Age: 25
End: 2025-02-04
Payer: COMMERCIAL

## 2025-02-04 ENCOUNTER — APPOINTMENT (OUTPATIENT)
Dept: PHYSICAL THERAPY | Facility: CLINIC | Age: 25
End: 2025-02-04
Payer: COMMERCIAL

## 2025-02-04 ENCOUNTER — ANESTHESIA EVENT (OUTPATIENT)
Dept: OBSTETRICS AND GYNECOLOGY | Facility: HOSPITAL | Age: 25
End: 2025-02-04
Payer: COMMERCIAL

## 2025-02-04 PROBLEM — O34.219 VBAC (VAGINAL BIRTH AFTER CESAREAN) (HHS-HCC): Status: ACTIVE | Noted: 2025-02-03

## 2025-02-04 PROCEDURE — 7210000002 HC LABOR PER HOUR

## 2025-02-04 PROCEDURE — 7210000002 HC LABOR PER HOUR: Performed by: SPECIALIST

## 2025-02-04 PROCEDURE — 2500000004 HC RX 250 GENERAL PHARMACY W/ HCPCS (ALT 636 FOR OP/ED)

## 2025-02-04 PROCEDURE — 51701 INSERT BLADDER CATHETER: CPT

## 2025-02-04 PROCEDURE — 2500000001 HC RX 250 WO HCPCS SELF ADMINISTERED DRUGS (ALT 637 FOR MEDICARE OP): Performed by: STUDENT IN AN ORGANIZED HEALTH CARE EDUCATION/TRAINING PROGRAM

## 2025-02-04 PROCEDURE — 1210000001 HC SEMI-PRIVATE ROOM DAILY

## 2025-02-04 PROCEDURE — 51701 INSERT BLADDER CATHETER: CPT | Performed by: SPECIALIST

## 2025-02-04 PROCEDURE — 3700000014 EPIDURAL BLOCK: Performed by: NURSE ANESTHETIST, CERTIFIED REGISTERED

## 2025-02-04 PROCEDURE — 7100000016 HC LABOR RECOVERY PER HOUR

## 2025-02-04 PROCEDURE — 10907ZC DRAINAGE OF AMNIOTIC FLUID, THERAPEUTIC FROM PRODUCTS OF CONCEPTION, VIA NATURAL OR ARTIFICIAL OPENING: ICD-10-PCS | Performed by: SPECIALIST

## 2025-02-04 PROCEDURE — 2500000001 HC RX 250 WO HCPCS SELF ADMINISTERED DRUGS (ALT 637 FOR MEDICARE OP): Performed by: NURSE PRACTITIONER

## 2025-02-04 RX ORDER — ACETAMINOPHEN 325 MG/1
975 TABLET ORAL EVERY 6 HOURS
Status: DISCONTINUED | OUTPATIENT
Start: 2025-02-04 | End: 2025-02-06 | Stop reason: HOSPADM

## 2025-02-04 RX ORDER — MISOPROSTOL 200 UG/1
800 TABLET ORAL ONCE AS NEEDED
Status: DISCONTINUED | OUTPATIENT
Start: 2025-02-04 | End: 2025-02-06 | Stop reason: HOSPADM

## 2025-02-04 RX ORDER — ADHESIVE BANDAGE
10 BANDAGE TOPICAL
Status: DISCONTINUED | OUTPATIENT
Start: 2025-02-04 | End: 2025-02-06 | Stop reason: HOSPADM

## 2025-02-04 RX ORDER — IBUPROFEN 600 MG/1
600 TABLET ORAL EVERY 6 HOURS
Status: DISCONTINUED | OUTPATIENT
Start: 2025-02-04 | End: 2025-02-06 | Stop reason: HOSPADM

## 2025-02-04 RX ORDER — NIFEDIPINE 10 MG/1
10 CAPSULE ORAL ONCE AS NEEDED
Status: DISCONTINUED | OUTPATIENT
Start: 2025-02-04 | End: 2025-02-06 | Stop reason: HOSPADM

## 2025-02-04 RX ORDER — TRANEXAMIC ACID 100 MG/ML
1000 INJECTION, SOLUTION INTRAVENOUS ONCE AS NEEDED
Status: ACTIVE | OUTPATIENT
Start: 2025-02-04 | End: 2025-02-05

## 2025-02-04 RX ORDER — OXYTOCIN 10 [USP'U]/ML
10 INJECTION, SOLUTION INTRAMUSCULAR; INTRAVENOUS ONCE AS NEEDED
Status: DISCONTINUED | OUTPATIENT
Start: 2025-02-04 | End: 2025-02-06 | Stop reason: HOSPADM

## 2025-02-04 RX ORDER — DIPHENHYDRAMINE HCL 25 MG
25 CAPSULE ORAL EVERY 6 HOURS PRN
Status: DISCONTINUED | OUTPATIENT
Start: 2025-02-04 | End: 2025-02-06 | Stop reason: HOSPADM

## 2025-02-04 RX ORDER — LOPERAMIDE HYDROCHLORIDE 2 MG/1
4 CAPSULE ORAL EVERY 2 HOUR PRN
Status: DISCONTINUED | OUTPATIENT
Start: 2025-02-04 | End: 2025-02-06 | Stop reason: HOSPADM

## 2025-02-04 RX ORDER — CARBOPROST TROMETHAMINE 250 UG/ML
250 INJECTION, SOLUTION INTRAMUSCULAR ONCE AS NEEDED
Status: DISCONTINUED | OUTPATIENT
Start: 2025-02-04 | End: 2025-02-06 | Stop reason: HOSPADM

## 2025-02-04 RX ORDER — BISACODYL 10 MG/1
10 SUPPOSITORY RECTAL DAILY PRN
Status: DISCONTINUED | OUTPATIENT
Start: 2025-02-04 | End: 2025-02-06 | Stop reason: HOSPADM

## 2025-02-04 RX ORDER — METOCLOPRAMIDE HYDROCHLORIDE 5 MG/ML
10 INJECTION INTRAMUSCULAR; INTRAVENOUS EVERY 6 HOURS PRN
Status: DISCONTINUED | OUTPATIENT
Start: 2025-02-04 | End: 2025-02-04

## 2025-02-04 RX ORDER — ONDANSETRON HYDROCHLORIDE 2 MG/ML
4 INJECTION, SOLUTION INTRAVENOUS EVERY 6 HOURS PRN
Status: DISCONTINUED | OUTPATIENT
Start: 2025-02-04 | End: 2025-02-06 | Stop reason: HOSPADM

## 2025-02-04 RX ORDER — LABETALOL HYDROCHLORIDE 5 MG/ML
20 INJECTION, SOLUTION INTRAVENOUS ONCE AS NEEDED
Status: DISCONTINUED | OUTPATIENT
Start: 2025-02-04 | End: 2025-02-06 | Stop reason: HOSPADM

## 2025-02-04 RX ORDER — POLYETHYLENE GLYCOL 3350 17 G/17G
17 POWDER, FOR SOLUTION ORAL 2 TIMES DAILY PRN
Status: DISCONTINUED | OUTPATIENT
Start: 2025-02-04 | End: 2025-02-06 | Stop reason: HOSPADM

## 2025-02-04 RX ORDER — HYDRALAZINE HYDROCHLORIDE 20 MG/ML
5 INJECTION INTRAMUSCULAR; INTRAVENOUS ONCE AS NEEDED
Status: DISCONTINUED | OUTPATIENT
Start: 2025-02-04 | End: 2025-02-06 | Stop reason: HOSPADM

## 2025-02-04 RX ORDER — METHYLERGONOVINE MALEATE 0.2 MG/ML
0.2 INJECTION INTRAVENOUS ONCE AS NEEDED
Status: DISCONTINUED | OUTPATIENT
Start: 2025-02-04 | End: 2025-02-06 | Stop reason: HOSPADM

## 2025-02-04 RX ORDER — SIMETHICONE 80 MG
80 TABLET,CHEWABLE ORAL 4 TIMES DAILY PRN
Status: DISCONTINUED | OUTPATIENT
Start: 2025-02-04 | End: 2025-02-06 | Stop reason: HOSPADM

## 2025-02-04 RX ORDER — DIPHENHYDRAMINE HYDROCHLORIDE 50 MG/ML
25 INJECTION INTRAMUSCULAR; INTRAVENOUS EVERY 6 HOURS PRN
Status: DISCONTINUED | OUTPATIENT
Start: 2025-02-04 | End: 2025-02-06 | Stop reason: HOSPADM

## 2025-02-04 RX ORDER — CALCIUM CARBONATE 200(500)MG
500 TABLET,CHEWABLE ORAL 4 TIMES DAILY PRN
Status: DISCONTINUED | OUTPATIENT
Start: 2025-02-04 | End: 2025-02-04

## 2025-02-04 RX ORDER — ONDANSETRON 4 MG/1
4 TABLET, FILM COATED ORAL EVERY 6 HOURS PRN
Status: DISCONTINUED | OUTPATIENT
Start: 2025-02-04 | End: 2025-02-06 | Stop reason: HOSPADM

## 2025-02-04 RX ADMIN — ACETAMINOPHEN 975 MG: 325 TABLET ORAL at 23:47

## 2025-02-04 RX ADMIN — Medication 60 MILLI-UNITS/MIN: at 16:35

## 2025-02-04 RX ADMIN — PENICILLIN G 3 MILLION UNITS: 3000000 INJECTION, SOLUTION INTRAVENOUS at 13:17

## 2025-02-04 RX ADMIN — PENICILLIN G 3 MILLION UNITS: 3000000 INJECTION, SOLUTION INTRAVENOUS at 08:44

## 2025-02-04 RX ADMIN — PENICILLIN G 3 MILLION UNITS: 3000000 INJECTION, SOLUTION INTRAVENOUS at 04:29

## 2025-02-04 RX ADMIN — Medication 8 ML/HR: at 04:23

## 2025-02-04 RX ADMIN — Medication 1 ML: at 04:24

## 2025-02-04 RX ADMIN — Medication 1 ML: at 04:36

## 2025-02-04 RX ADMIN — Medication 1 ML: at 04:56

## 2025-02-04 RX ADMIN — ONDANSETRON 4 MG: 2 INJECTION INTRAMUSCULAR; INTRAVENOUS at 03:16

## 2025-02-04 RX ADMIN — ACETAMINOPHEN 975 MG: 325 TABLET ORAL at 17:58

## 2025-02-04 RX ADMIN — Medication 10 ML/HR: at 12:49

## 2025-02-04 RX ADMIN — IBUPROFEN 600 MG: 600 TABLET, FILM COATED ORAL at 23:47

## 2025-02-04 RX ADMIN — IBUPROFEN 600 MG: 600 TABLET, FILM COATED ORAL at 17:58

## 2025-02-04 RX ADMIN — SODIUM CHLORIDE, POTASSIUM CHLORIDE, SODIUM LACTATE AND CALCIUM CHLORIDE 500 ML: 600; 310; 30; 20 INJECTION, SOLUTION INTRAVENOUS at 03:45

## 2025-02-04 RX ADMIN — CALCIUM CARBONATE (ANTACID) CHEW TAB 500 MG 500 MG: 500 CHEW TAB at 13:16

## 2025-02-04 RX ADMIN — METOCLOPRAMIDE 10 MG: 5 INJECTION, SOLUTION INTRAMUSCULAR; INTRAVENOUS at 14:01

## 2025-02-04 RX ADMIN — PENICILLIN G 3 MILLION UNITS: 3000000 INJECTION, SOLUTION INTRAVENOUS at 00:30

## 2025-02-04 SDOH — HEALTH STABILITY: MENTAL HEALTH: CURRENT SMOKER: 0

## 2025-02-04 ASSESSMENT — PAIN SCALES - GENERAL

## 2025-02-04 NOTE — SIGNIFICANT EVENT
Pt. Sleeping s/p epidural.     SVE def   Pit @ 14mu    FHT: 125 baseline, moderate, +accels, +early decels  Cat I   TOCO: q 2-4 min  AROM x 4 hours    -Continue maternal positioning  -Continue oxytocin per guidelines  -Continue to monitor maternal/fetal status    Christine Hernandez, APRBELINDA-CNM

## 2025-02-04 NOTE — SIGNIFICANT EVENT
Assessment    24 y.o.  at 41w0d  FHT Category 2, overall reassuring for moderate variability and + accels  Latent labor  TOLAC  GBS pos  Plan    Pitocin off now, will restart in 30 mins if able  Encourage frequent position changes as tolerated  PCN GBS prophylaxis  Continue assessment of maternal and fetal wellbeing  Recheck as clinically indicated by maternal or fetal status  Anticipate active phase of labor    Yolanda Melgoza, APRN-CNM, APRN-CNP    Subjective:  Marilou Salazar [place] is comfortable with epidural. At bedside to assess for late decels.     Objective:  Fetal Monitoring      Baseline FHR: 120 per minute  Variability: moderate  Accelerations: yes  Decelerations: late decels x2  TOCO: Contraction Frequency: 2-3.5     Cervical Exam:  6 cm dilated, 70 effaced, -1 station (exam by Christine Hernandez CNM)    Membrane Status:  (leaking)  Rupture Date: 25  Rupture Time: 0200  Fluid Color: Clear    Pitocin is now off, was at 16 mu/min.     Vitals:    25 0530 25 0628 25 0720 25 0721   BP: 98/54 107/69  108/65   Pulse: 94 83 86 83   Resp: 16 16  16   Temp: 36.8 °C (98.2 °F) 36.5 °C (97.7 °F)  36.5 °C (97.7 °F)   TempSrc: Temporal Temporal  Temporal   SpO2: 98% 99% 97% 98%   Weight:       Height:

## 2025-02-04 NOTE — PROGRESS NOTES
Intrapartum Progress Note    Assessment/Plan   Marilou Kaufman is a 24 y.o.  at 40w6d. CATHIE: 2025, by Last Menstrual Period.     A:  IOL  TOLAC  Latent labor  Cat II FHT  GBS+    P:  Discussed AROM with appropriate fetal station, encouraged maternal positioning. Would like to be out of bed and mobile for awhile prior to start oxytocin.   Continue to monitor maternal/fetal status    OLGA Oneal-ANDREWS       Assessment & Plan  Encounter for planned induction of labor    Pregnancy Problems (from 24 to present)       Problem Noted Diagnosed Resolved    Encounter for planned induction of labor 2/3/2025 by Melissa L Zahorsky, APRN-CNM  No    Priority:  Medium       Pruritus of pregnancy in third trimester ([facility]) 2025 by Eugenia Guadarrama MD  No    Priority:  Medium       Overview Signed 2025  3:20 PM by Eugenia Guadarrama MD     Itching of legs and feet. CMP and bile acids are ordered.          Group B Streptococcus carrier state affecting pregnancy 2025 by Eugenia Guadarrama MD  No    Priority:  Medium       Overview Signed 2025 10:58 AM by Eugenia Guadarrama MD     Plan antibiotics at delivery.         40 weeks gestation of pregnancy ([facility]) 2024 by Eugenia Guadarrama MD  No    Priority:  Medium       Overview Addendum 2025  3:43 PM by Oralia Crane RN     Desired provider in labor: [] CNM  [] Physician  [x] Blood Products: [x] Yes, accepts [] No, needs counseling  [x] Initial BMI: 18.53   [x] Prenatal Labs:   [x] Cervical Cancer Screening up to date  [x] Rh status: O pos  [x] Genetic Screening:  NIPS risk reducing.  [x] NT US: (11-13 wks)  [x] Baby ASA (if indicated):NA  [x] Pregnancy dated by: lmp    [x] Anatomy US: (19-20 wks)No malformations were identified on this comprehensive survey within limitations of sonographic evaluation at this gestational age.   [] Federal Sterilization consent signed (if indicated):  [x] 1hr GCT at  24-28wks: 64  [] Rhogam (if indicated): not indicated  [] Fetal Surveillance (if indicated):  [x] Tdap (27-32 wks, may be given up to 36 wks if initial window missed): 11/7/2024.  [x] RSV (32-36 wks) (Sept. to end of Jan): 12/27/24   [x] Flu Vaccine:9/12/2024    [] Breastfeeding:  [x] Postpartum Birth control method:   [x] GBS at 36 - 37 wks: positive  [] 39 weeks discussion of IOL vs. Expectant management:  [] Mode of delivery ( anticipated ):           Young multigravida in third trimester ([facility]) 6/19/2024 by Eugenia Guadarrama MD  No    Priority:  Medium       Overview Addendum 7/17/2024 12:49 PM by Eugenia Guadarrama MD     NIPS returned risk reducing.  Carrier screening returned negative.                  Subjective   Pt. Coping well, reports mild cramping. CRB out.    at bedside.     Objective   Last Vitals:  Temp Pulse Resp BP MAP Pulse Ox   36.2 °C (97.2 °F) 79 18 119/74 91 98 %     Vitals Min/Max Last 24 Hours:  Temp  Min: 36.2 °C (97.2 °F)  Max: 37.2 °C (99 °F)  Pulse  Min: 79  Max: 109  Resp  Min: 18  Max: 18  BP  Min: 118/81  Max: 119/74  MAP (mmHg)  Min: 91  Max: 95    Intake/Output:  No intake or output data in the 24 hours ending 02/03/25 2140    Physical Examination:  GENERAL: Examination reveals a well developed, well nourished, gravid female in no acute distress. She is alert and cooperative.  FHR is 135 ,  moderate variability, with Accelerations, nonrecurrent variable decels,  and a Cat II tracing.    [place] reading:  q 2-3 min   CERVIX: 5 cm dilated, 50 % effaced, -3  (ballotable) station; MEMBRANES are Intact  PSYCHOLOGICAL: awake and alert; oriented to person, place, and time

## 2025-02-04 NOTE — L&D DELIVERY NOTE
OB Delivery Note  2025  Marilou Kaufman  24 y.o.     /Para:   Gestational Age: 41w0d  Labor Complications: None  Quantitative Blood Loss: Admission to Discharge: 418 mL (2/3/2025  1:43 PM - 2025  5:46 PM)  Delivery Type: , Spontaneous  ROM to Delivery Time: 14h 14m  [place] Sex: Male  [place] Weight: 3.33 kg    Danielle Kaufman [[id number]]      Labor Events    Rupture date/time: 2025 0200  Rupture type: Artificial  Fluid color: Clear, Pink  Fluid odor: None  Labor type: Induced Onset of Labor  Labor allowed to proceed with plans for an attempted vaginal birth?: Yes  Induction: Mcintosh/EASI, Oxytocin, AROM  First cervical ripening date/time: 2/3/2025 1625  Induction date/time: 2/3/2025 1400  Induction indications: Post-term Gestation  Complications: None       Labor Event Times    Labor onset date/time: 2/3/2025 1522  Dilation complete date/time: 2025 1504  Start pushing date/time: 2025 1510       Labor Length    1st stage: 23h 42m  2nd stage: 1h 10m  3rd stage: 0h 05m       Placenta    Placenta delivery date/time: 2025 1619  Placenta removal: Spontaneous  Placenta appearance: Intact  Placenta disposition: discarded       Cord    Vessels: 3 vessels  Complications: None  Delayed cord clamping?: Yes  Cord clamped date/time: 2025 16:15:00  Cord blood disposition: Lab  Gases sent?: No  Stem cell collection (by provider): No       Lacerations    Perineal laceration: None  Other lacerations?: No  Repair suture: None       Anesthesia    Method: Epidural       Operative Delivery    Forceps attempted?: No  Vacuum extractor attempted?: No       Shoulder Dystocia    Shoulder dystocia present?: No        Delivery    Time head delivered: 2025 16:14:00  Birth date/time: 2025 16:14:00  Delivery type: , Spontaneous  Complications: None       Resuscitation    Method: Suctioning, Tactile stimulation       Apgars    Living status: Living  Apgar Component  Scores:  1 min.:  5 min.:  10 min.:  15 min.:  20 min.:    Skin color:  1  1       Heart rate:  2  2       Reflex irritability:  2  2       Muscle tone:  2  2       Respiratory effort:  2  2       Total:  9  9       Apgars assigned by: MELISSA HESTER       Delivery Providers    Delivering clinician: BIANCA Giles, APRN-CNP   Provider Role    Christine Prado, RN Delivery Nurse    Bibi Richmond, RN Nursery Nurse    Germania Huerta MD Resident               Delivery Details:  Marilou Kaufman, melina 24 y.o.  female delivered a viable Male infant with Apgars of 9  and 9 . Patient was fully dilated and pushing after 23 hours 42 minutes in labor. The patient was put in the dorsal lithotomy position and put on her side. Fetal descent noted throughout pushing efforts. Hands-on perineum at  with controlled extension of fetal head. Shoulders delivered swiftly following restitution. Delivery was via , Spontaneous to a sterile field under Epidural anesthesia. Infant delivered in Vertex Right Occiput Anterior position. Postpartum pitocin bolus initiated. Vigorous infant placed on maternal abdomen for skin to skin. Cord cut by FOB after delayed cord clamping. Placenta delivered spontaneously and intact with gentle traction on umbilical cord. Cord blood was obtained in routine fashion. Cord complications were:  none. Intact placenta delivered at 2025  4:19 PM. Fundal massage performed. Fundus palpated firm, midline, and at umbilicus. Perineum, vagina, cervix were inspected, and the following lacerations were noted:   Danielle Kaufman [[id number]]      Lacerations    Perineal laceration: None  Other lacerations?: No  Repair suture: None            Excellent hemostasis was noted. Needle count correct. Infant and patient in delivery room in good and stable condition.   BIANCA Irvin, ERIBERTO

## 2025-02-04 NOTE — SIGNIFICANT EVENT
Assessment    24 y.o.  at 41w0d  FHT Category 2 - overall reassuring for moderate variability and + accels  Active labor  TOLAC  GBS pos  Plan    Pitocin turn off (second time this morning), FHR improved with repositioning and discontinuation of pitocin  Encourage frequent position changes as tolerated  Reassess ability to restart pitocin per protocol  PCN GBS prophylaxis  Continue assessment of maternal and fetal wellbeing  Recheck as clinically indicated by maternal or fetal status  Anticipate second stage of labor  Yolanda Melgoza, APRN-CNM, APRN-CNP    Subjective:  Marilou Salazar [place] is comfortable with epidural. Called to room to assess prolonged decel.    Objective:  Fetal Monitoring      Baseline FHR: 130 per minute  Variability: moderate  Accelerations: yes  Decelerations:  occasional variable decel, prolonged decel x2.5 mins  at 0847  TOCO: Contraction Frequency: 2-5     Cervical Exam:  6 cm dilated, 90 effaced, -1 station    Membrane Status:  (leaking)  Rupture Date: 25  Rupture Time: 0200  Fluid Color: Clear, Pink    Pitocin is now off, was at 8 mu/min.     Vitals:    25 0628 25 0720 25 0721 25 0842   BP: 107/69  108/65 103/57   Pulse: 83 86 83 88   Resp: 16  16 16   Temp: 36.5 °C (97.7 °F)  36.5 °C (97.7 °F) 36.8 °C (98.2 °F)   TempSrc: Temporal  Temporal Temporal   SpO2: 99% 97% 98% 98%   Weight:       Height:

## 2025-02-04 NOTE — SIGNIFICANT EVENT
Assessment    24 y.o.  at 41w0d  FHT Category 1  Active labor  TOLAC  GBS pos  Plan    Encourage frequent position changes as tolerated  Continue pitocin per protocol  PCN GBS prophylaxis  Continue assessment of maternal and fetal wellbeing  Recheck as clinically indicated by maternal or fetal status  Anticipate second stage of labor    Yolanda Melgoza, APRN-CNM, APRN-CNP    Subjective:  Marilou Kaufman is comfortable, reports intermittent rectal pressure.     Objective:  Fetal Monitoring      Baseline FHR: 135 per minute  Variability: moderate  Accelerations: yes  Decelerations: none  TOCO: Contraction Frequency: 2.5-3     Cervical Exam:  8 cm dilated, 90 effaced, 0 station (exam by Dr. Jaimes at 1349)    Membrane Status:  (leaking)  Rupture Date: 25  Rupture Time: 0200  Fluid Color: Clear    Pitocin is at 10 oksana-units/min.    Vitals:    25 1402 25 1407 25 1412 25 1417   BP:       Pulse: 89 94 98 102   Resp:       Temp:       TempSrc:       SpO2: 97% 97% 97% 97%   Weight:       Height:

## 2025-02-04 NOTE — ANESTHESIA PROCEDURE NOTES
Epidural Block    Patient location during procedure: OB  Start time: 2/4/2025 3:58 AM  End time: 2/4/2025 4:21 AM  Reason for block: labor analgesia  Staffing  Performed: CRNA   Authorized by: Car Paniagua MD PhD    Performed by: FREDI Mcgee    Preanesthetic Checklist  Completed: patient identified, IV checked, risks and benefits discussed, surgical consent, monitors and equipment checked, pre-op evaluation, timeout performed and sterile techniques followed  Block Timeout  RN/Licensed healthcare professional reads aloud to the Anesthesia provider and entire team: Patient identity, procedure with side and site, patient position, and as applicable the availability of implants/special equipment/special requirements.  Patient on coagulant treatment: no  Timeout performed at: 2/4/2025 3:58 AM  Block Placement  Patient position: sitting  Prep: ChloraPrep  Sterility prep: cap and drape  Sedation level: no sedation  Patient monitoring: continuous pulse oximetry and blood pressure  Approach: midline  Local numbing: lidocaine 1% to skin and subcutaneous tissues  Vertebral space: lumbar  Lumbar location: L3-L4  Epidural  Loss of resistance technique: saline  Guidance: landmark technique        Needle  Needle type: Tuohy   Needle gauge: 17  Needle length: 8.9cm  Needle insertion depth: 4.5 cm  Catheter type: multi-orifice  Catheter size: 19 G  Catheter at skin depth: 9.5 cm  Catheter securement method: clear occlusive dressing and liquid medical adhesive    Test dose: lidocaine 1.5% with epinephrine 1-to-200,000  Test dose: lidocaine 1.5% with epinephrine 1-to-200,000  Test dose result: no positive test dose    PCEA  Medication concentration used: 0.2% Ropivacaine with 2 mcg/mL Fentanyl  Dose (mL): 5  Lockout (minutes): 30  1-Hour Limit (boluses/hr): 2  Basal Rate: 8        Assessment  Number of attempts: 3 or more  Events: no positive test dose  Procedure assessment: patient tolerated procedure well with  no immediate complications  Additional Notes  Multiple passes, same level, os encountered. Pt tolerated well. +TING -heme -parasthesia -csf

## 2025-02-04 NOTE — SIGNIFICANT EVENT
Pt. Coping well. Amenable to SVE and AROM.   SVE 5-6/60/-1, more mid position  AROM to moderate amount of clear nonodorous fluid   +bloody show  FHT: 130, moderate, +accels, +nonrecurrent variable decel  TOCO: q 2-3 min       -Continue supportive care, desires unmedicated birth  -Continue oxytocin per guidelines  -Intrauterine resuscitation measures prn  -Anticipate     Christine Hernandez, APRN-CNM

## 2025-02-04 NOTE — SIGNIFICANT EVENT
Assessment    24 y.o.  at 41w0d  FHT Category 2 - overall reassuring for moderate variability and + accels  Active labor  TOLAC  GBS pos  Plan    Encourage frequent position changes as tolerated  Start/Continue pitocin per protocol  PCN GBS prophylaxis  Discussed with patient option of IUPC placement if variable decels worsen, will defer for now  Continue assessment of maternal and fetal wellbeing  Recheck as clinically indicated by maternal or fetal status  Anticipate second stage of labor    Yolanda Melgoza, APRN-CNM, APRN-CNP    Subjective:  Marilou [place] is comfortable with epidural. Denies feeling pain or pressure.    Objective:  Fetal Monitoring      Baseline FHR: 135 per minute  Variability: moderate  Accelerations: yes  Decelerations: occasional variable decels   TOCO: Contraction Frequency: 4-5     Cervical Exam:  deferred    Membrane Status:  (leaking)  Rupture Date: 25  Rupture Time: 0200  Fluid Color: Clear    Pitocin is at 4 oksana-units/min.    Vitals:    25 0721 25 0842 25 0930 25 1027   BP: 108/65 103/57 108/59 101/59   Pulse: 83 88 88 85   Resp: 16 16 18 18   Temp: 36.5 °C (97.7 °F) 36.8 °C (98.2 °F) 36.6 °C (97.9 °F) 36.2 °C (97.2 °F)   TempSrc: Temporal Temporal Temporal Axillary   SpO2: 98% 98% 97% 98%   Weight:       Height:

## 2025-02-04 NOTE — SIGNIFICANT EVENT
Pt. sitting in bed. Coping well, feeling like she could rest.   SVE def  Pit @ 4mu  FHT: Cat I   TOCO: q 2-3 min  VSS    -Pain control prn  -Continue oxytocin per guidelines  -Continue to monitor maternal/fetal status      Christine Hernandez, APRN-CNM

## 2025-02-04 NOTE — ANESTHESIA PREPROCEDURE EVALUATION
Patient: Marilou Salazar [place]    Evaluation Method: In-person visit    Procedure Information    Date: 25  Procedure: Labor Consult         Relevant Problems   Anesthesia  Slow emergence as kid with ga, no issue with spinal for c/s. No family hx.      Cardiac (within normal limits)      Pulmonary (within normal limits)      Neuro (within normal limits)      GI (within normal limits)      /Renal (within normal limits)      Liver (within normal limits)      Endocrine (within normal limits)      Hematology (within normal limits)      Musculoskeletal   (+) Scoliosis      GYN   (+) 40 weeks gestation of pregnancy ([facility])   (+) Young multigravida in third trimester ([facility])       Clinical information reviewed:    Allergies  Meds               NPO Detail:  2/3 2200    Visit Vitals  /60   Pulse 79   Temp 36.1 °C (97 °F) (Temporal)   Resp 16         OB/Gyn Evaluation    Present Pregnancy    Patient is pregnant now.  (+) , previous  section (for Breech position)   Obstetric History    (+)  section              Physical Exam    Airway  Mallampati: II  TM distance: >3 FB  Neck ROM: full     Cardiovascular   Rhythm: regular  Rate: abnormal     Dental    Pulmonary    Abdominal            Anesthesia Plan    History of general anesthesia?: no  History of complications of general anesthesia?: no    ASA 2     epidural     The patient is not a current smoker.    Anesthetic plan and risks discussed with patient.  Use of blood products discussed with patient who consented to blood products.    Plan discussed with attending.

## 2025-02-05 PROBLEM — Z34.90 ENCOUNTER FOR INDUCTION OF LABOR: Status: ACTIVE | Noted: 2025-02-05

## 2025-02-05 PROCEDURE — 2500000001 HC RX 250 WO HCPCS SELF ADMINISTERED DRUGS (ALT 637 FOR MEDICARE OP): Performed by: ADVANCED PRACTICE MIDWIFE

## 2025-02-05 PROCEDURE — 1210000001 HC SEMI-PRIVATE ROOM DAILY

## 2025-02-05 PROCEDURE — 1220000001 HC OB SEMI-PRIVATE ROOM DAILY

## 2025-02-05 PROCEDURE — 2500000001 HC RX 250 WO HCPCS SELF ADMINISTERED DRUGS (ALT 637 FOR MEDICARE OP): Performed by: NURSE PRACTITIONER

## 2025-02-05 RX ORDER — DOCUSATE SODIUM 100 MG/1
100 CAPSULE, LIQUID FILLED ORAL NIGHTLY PRN
Status: DISCONTINUED | OUTPATIENT
Start: 2025-02-05 | End: 2025-02-06 | Stop reason: HOSPADM

## 2025-02-05 RX ADMIN — IBUPROFEN 600 MG: 600 TABLET, FILM COATED ORAL at 05:31

## 2025-02-05 RX ADMIN — ACETAMINOPHEN 975 MG: 325 TABLET ORAL at 17:28

## 2025-02-05 RX ADMIN — IBUPROFEN 600 MG: 600 TABLET, FILM COATED ORAL at 23:44

## 2025-02-05 RX ADMIN — ACETAMINOPHEN 975 MG: 325 TABLET ORAL at 23:44

## 2025-02-05 RX ADMIN — ACETAMINOPHEN 975 MG: 325 TABLET ORAL at 11:46

## 2025-02-05 RX ADMIN — IBUPROFEN 600 MG: 600 TABLET, FILM COATED ORAL at 11:47

## 2025-02-05 RX ADMIN — DOCUSATE SODIUM 100 MG: 100 CAPSULE, LIQUID FILLED ORAL at 17:04

## 2025-02-05 RX ADMIN — ACETAMINOPHEN 975 MG: 325 TABLET ORAL at 05:31

## 2025-02-05 RX ADMIN — IBUPROFEN 600 MG: 600 TABLET, FILM COATED ORAL at 17:03

## 2025-02-05 ASSESSMENT — PAIN SCALES - GENERAL
PAINLEVEL_OUTOF10: 2
PAINLEVEL_OUTOF10: 5 - MODERATE PAIN
PAINLEVEL_OUTOF10: 1
PAINLEVEL_OUTOF10: 2
PAINLEVEL_OUTOF10: 1
PAIN_LEVEL: 0

## 2025-02-05 ASSESSMENT — PAIN DESCRIPTION - DESCRIPTORS
DESCRIPTORS: DISCOMFORT;SORE
DESCRIPTORS: ACHING;CRAMPING
DESCRIPTORS: SORE
DESCRIPTORS: ACHING
DESCRIPTORS: CRAMPING
DESCRIPTORS: DISCOMFORT;SORE
DESCRIPTORS: SORE

## 2025-02-05 ASSESSMENT — PAIN DESCRIPTION - LOCATION
LOCATION: PERINEUM
LOCATION: VAGINA
LOCATION: ABDOMEN

## 2025-02-05 NOTE — LACTATION NOTE
Lactation Consultant Note  Lactation Consultation  Reason for Consult: Initial assessment  Consultant Name: Suha Long RN, IBCLC    Maternal Information  Has mother  before?: Yes  How long did the mother previously breastfeed?: 8 months  Infant to breast within first 2 hours of birth?: Yes  Exclusive Pump and Bottle Feed: No    Maternal Assessment  Breast Assessment: Other (Comment) (d/f mom just finished feeding baby at the breast)    Infant Assessment  Infant Behavior: Content after feeding, Light sleep    Feeding Assessment  Nutrition Source: Breastmilk  Feeding Method: Nursing at the breast  Unable to assess infant feeding at this time: Other (Comment) (Baby just finished the feeding when I came into the room- appears content at this time)    LATCH TOOL       Breast Pump       Other OB Lactation Tools       Patient Follow-up  Inpatient Lactation Follow-up Needed : Yes  Outpatient Lactation Follow-up: Recommended    Other OB Lactation Documentation       Recommendations/Summary  Baby around 20 hours of life at time of my visit.   I did not view a latch d/t baby just came off of the breast and appears content at this time.   Mom stated she breast fed her last baby up until she got pregnant with this baby.   She stated she can independently latch the baby to the breast and denies any pain with the latch. If she does feel pain she knows she needs to adjust the latch.     Reviewed feeding cues, the normal feeding patterns in the first 24 hours of life, the role of colostrum, output on different days of life, milk production/ supply in the first few days of life, and the benefits of skin to skin.      Encouraged mom to breastfeed on demand with a goal of 8-12 feeds in a 24 hour period.   If baby is not showing feeding cues and it has been 3 hours since the last time the baby was fed or the last time the baby attempted to feed, encouraged her to place baby in skin to skin.      Encouraged her to  call for assistance, if needed/ wanted.     She has a breast pump for at home.   Denies any questions or concerns at this time.     Encouraged her to utilize the outpatient lactation resources, if needed. Contact information given.   [phone] [facility] or [phone] [place]

## 2025-02-05 NOTE — DISCHARGE INSTRUCTIONS

## 2025-02-05 NOTE — PROGRESS NOTES
Postpartum Progress Note    Subjective   Marilou Kaufman is a 24 y.o., , who delivered at 41w0d gestation and is now postpartum day 1.  Hospital course: no complications    Her pain is well controlled with current medications  She is not having trouble urinating. She is not yet passing gas. No trouble eating.   She is ambulating well  She reports no breast or nursing problems  She denies emotional concerns today   Her plan for contraception is none    Pregnancy Problems (from 24 to present)       Problem Noted Diagnosed Resolved     (vaginal birth after ) ([facility]) 2/3/2025 by Melissa L Zahorsky, APRN-ANDREWS  No    Priority:  Medium       Pruritus of pregnancy in third trimester ([facility]) 2025 by uEgenia Guadarrama MD  No    Priority:  Medium       Overview Signed 2025  3:20 PM by Eugenia Guadarrama MD     Itching of legs and feet. CMP and bile acids are ordered.           Objective   Allergies:   Patient has no known allergies.         Last Vitals:  Temp Pulse Resp BP MAP Pulse Ox   37.2 °C (99 °F) 91 16 102/68   97 %     Vitals Min/Max Last 24 Hours:  Temp  Min: 36 °C (96.8 °F)  Max: 37.6 °C (99.7 °F)  Pulse  Min: 76  Max: 125  Resp  Min: 16  Max: 18  BP  Min: 101/59  Max: 125/71    Physical Exam:  General: Examination reveals a well developed, well nourished, female, in no acute distress. She is alert and cooperative  HEENT: External ears normal. Nose normal, no erythema or discharge  Lungs: breathing even and unlabored   Cardiac: warm and well perfused   Breasts: nipples intact  Fundus: firm and below umbilicus, lochia light  Extremities: no redness or tenderness in the calves or thighs, no edema  Neurological: alert, oriented, normal speech, no focal findings or movement disorder noted  Psychological: awake and alert; oriented to person, place, and time    Lab Data:  Results from last 7 days   Lab Units 25  1555   HEMOGLOBIN g/dL 11.4*      Assessment/Plan    Routine postpartum care  - meeting all milestones  - no lacerations,  mL  - bonding with infant  - pain well controlled on po medications  - dvt risk score DVT Score (IF A SCORE IS NOT CALCULATING, MUST SELECT A BMI TO COMPLETE): 3 , prophylactic lovenox not indicated  - Patient is Rh Positive (Rh+).; Not eligible  - continue routine postpartum care    Maternal Well-Being  - emotional support provided    [place] Feeding  - breastfeeding/pumping encouraged; lactation consult prn    Contraception  - Contraception methods discussed, including contraindication for use of estrogen in immediate postpartum period    Dispo  - anticipate d/c on PPD #2 if meeting all postpartum milestones  Follow up in 4-6 wk for postpartum visit with your OB provider.     OLGA Gage-ANDREWS

## 2025-02-05 NOTE — ANESTHESIA POSTPROCEDURE EVALUATION
Patient: Marilou Kaufman    Procedure Summary       Date: 25 Room / Location:     Anesthesia Start: 358 Anesthesia Stop:     Procedure: Labor Analgesia Diagnosis:     Scheduled Providers:  Responsible Provider: Angeline Ordaz MD    Anesthesia Type: epidural ASA Status: 2          Anesthesia Post Evaluation    Patient location during evaluation: floor  Patient participation: complete - patient participated  Level of consciousness: awake  Pain score: 0  Pain management: adequate  Airway patency: patent  Cardiovascular status: acceptable  Respiratory status: acceptable  Hydration status: acceptable  Postoperative Nausea and Vomiting: none      Marilou Kaufman is a 24 y.o., , who had a , Spontaneous delivery on 2025 at 41w0d and is now POD1.    She had Neuraxial Anesthesia without immediate complications noted.       Pain well controlled    Vitals:    25 0815   BP: 102/68   Pulse: 91   Resp: 16   Temp: 37.2 °C (99 °F)   SpO2: 97%       Neuraxial site assessed. No visible redness or swelling or drainage. Patient able to ambulate and move all extremities without difficulty. Able to void. No complaints of nausea/vomiting. Tolerating PO intake well. No s/sx of PDPH.     Anesthesia will sign off     MEAGHAN Collins

## 2025-02-05 NOTE — CARE PLAN
The patient's goals for the shift include rest and bond with baby.    The clinical goals for the shift include stable vitals      Problem: Postpartum  Goal: Experiences normal postpartum course  Outcome: Progressing  Goal: Appropriate maternal -  bonding  Outcome: Progressing  Goal: Establish and maintain infant feeding pattern for adequate nutrition  Outcome: Progressing  Goal: Incisions, wounds, or drain sites healing without S/S of infection  Outcome: Progressing  Goal: No s/sx infection  Outcome: Progressing  Goal: No s/sx of hemorrhage  Outcome: Progressing  Goal: Minimal s/sx of HDP and BP<160/110  Outcome: Progressing     Vss. Pt comfortable and pain adequately managed.

## 2025-02-06 ENCOUNTER — PHARMACY VISIT (OUTPATIENT)
Dept: PHARMACY | Facility: CLINIC | Age: 25
End: 2025-02-06
Payer: COMMERCIAL

## 2025-02-06 ENCOUNTER — APPOINTMENT (OUTPATIENT)
Dept: OBSTETRICS AND GYNECOLOGY | Facility: CLINIC | Age: 25
End: 2025-02-06
Payer: COMMERCIAL

## 2025-02-06 VITALS
HEART RATE: 85 BPM | TEMPERATURE: 98.8 F | HEIGHT: 61 IN | BODY MASS INDEX: 26.06 KG/M2 | DIASTOLIC BLOOD PRESSURE: 77 MMHG | WEIGHT: 138.01 LBS | OXYGEN SATURATION: 97 % | SYSTOLIC BLOOD PRESSURE: 115 MMHG | RESPIRATION RATE: 16 BRPM

## 2025-02-06 PROCEDURE — RXMED WILLOW AMBULATORY MEDICATION CHARGE

## 2025-02-06 PROCEDURE — 2500000001 HC RX 250 WO HCPCS SELF ADMINISTERED DRUGS (ALT 637 FOR MEDICARE OP): Performed by: NURSE PRACTITIONER

## 2025-02-06 RX ORDER — IBUPROFEN 600 MG/1
600 TABLET ORAL EVERY 6 HOURS
Qty: 120 TABLET | Refills: 0 | Status: SHIPPED | OUTPATIENT
Start: 2025-02-06 | End: 2025-03-08

## 2025-02-06 RX ORDER — POLYETHYLENE GLYCOL 3350 17 G/17G
17 POWDER, FOR SOLUTION ORAL DAILY
Qty: 510 G | Refills: 0 | Status: SHIPPED | OUTPATIENT
Start: 2025-02-06

## 2025-02-06 RX ORDER — ACETAMINOPHEN 325 MG/1
975 TABLET ORAL EVERY 6 HOURS
Qty: 360 TABLET | Refills: 0 | Status: SHIPPED | OUTPATIENT
Start: 2025-02-06 | End: 2025-03-08

## 2025-02-06 RX ADMIN — ACETAMINOPHEN 975 MG: 325 TABLET ORAL at 11:56

## 2025-02-06 RX ADMIN — ACETAMINOPHEN 975 MG: 325 TABLET ORAL at 05:51

## 2025-02-06 RX ADMIN — IBUPROFEN 600 MG: 600 TABLET, FILM COATED ORAL at 05:51

## 2025-02-06 RX ADMIN — IBUPROFEN 600 MG: 600 TABLET, FILM COATED ORAL at 11:56

## 2025-02-06 SDOH — ECONOMIC STABILITY: TRANSPORTATION INSECURITY
IN THE PAST 12 MONTHS, HAS LACK OF TRANSPORTATION KEPT YOU FROM MEETINGS, WORK, OR FROM GETTING THINGS NEEDED FOR DAILY LIVING?: YES

## 2025-02-06 SDOH — ECONOMIC STABILITY: FOOD INSECURITY

## 2025-02-06 SDOH — ECONOMIC STABILITY: FOOD INSECURITY: WITHIN THE PAST 12 MONTHS, THE FOOD YOU BOUGHT JUST DIDN'T LAST AND YOU DIDN'T HAVE MONEY TO GET MORE.: SOMETIMES TRUE

## 2025-02-06 SDOH — ECONOMIC STABILITY: FOOD INSECURITY: WITHIN THE PAST 12 MONTHS, YOU WORRIED THAT YOUR FOOD WOULD RUN OUT BEFORE YOU GOT MONEY TO BUY MORE.: SOMETIMES TRUE

## 2025-02-06 SDOH — ECONOMIC STABILITY: TRANSPORTATION INSECURITY
IN THE PAST 12 MONTHS, HAS THE LACK OF TRANSPORTATION KEPT YOU FROM MEDICAL APPOINTMENTS OR FROM GETTING MEDICATIONS?: NO

## 2025-02-06 SDOH — ECONOMIC STABILITY: TRANSPORTATION INSECURITY: IN THE PAST 12 MONTHS, HAS LACK OF TRANSPORTATION KEPT YOU FROM MEDICAL APPOINTMENTS OR FROM GETTING MEDICATIONS?: NO

## 2025-02-06 SDOH — ECONOMIC STABILITY: TRANSPORTATION INSECURITY

## 2025-02-06 ASSESSMENT — PAIN SCALES - GENERAL
PAINLEVEL_OUTOF10: 0 - NO PAIN
PAINLEVEL_OUTOF10: 2
PAINLEVEL_OUTOF10: 0 - NO PAIN

## 2025-02-06 ASSESSMENT — PAIN DESCRIPTION - DESCRIPTORS: DESCRIPTORS: DISCOMFORT;SORE

## 2025-02-06 ASSESSMENT — ACTIVITIES OF DAILY LIVING (ADL): LACK_OF_TRANSPORTATION: YES

## 2025-02-06 ASSESSMENT — PAIN DESCRIPTION - LOCATION: LOCATION: VAGINA

## 2025-02-06 NOTE — DISCHARGE SUMMARY
Discharge Summary    Admission Date: 2/3/2025  Discharge Date: 25  Discharge Diagnosis:  (vaginal birth after ) ([facility])     Patient Active Problem List   Diagnosis    Abdominal discomfort    Acne    Leukocytes in urine    Low back pain    Scoliosis    Urinary frequency    Disorder of right eustachian tube    Lump in neck    Uterine scar from previous  delivery complicating pregnancy ([facility])    40 weeks gestation of pregnancy ([facility])    Young multigravida in third trimester ([facility])    Group B Streptococcus carrier state affecting pregnancy    Pruritus of pregnancy in third trimester ([facility])     (vaginal birth after ) ([facility])    Encounter for induction of labor     Hospital Course  Marilou Kaufman is a 24 y.o.,     Initially presented for: TOLAC IOL    Admission Date: 2/3/2025    Delivery Date: 2025 4:14 PM    Delivery type: , Spontaneous     GA at delivery: 41w0d    Outcome: Living    Anesthesia during delivery: Epidural    Intrapartum complications: None    Feeding method: Breastfeeding Status: Yes    Contraception: declines bridge method and discussed pregnancy spacing of at least one year, abstaining from intercourse for 6wks, and the ability to become pregnant in the absence of regular menses. Pt verbalized understanding    Rhogam: The patient's blood type is O POS. The baby's blood type is B POS. Rhogam is not indicated.     Now postpartum day: 2.    Hospital course n/f:    PP course uneventful.  Meeting all postpartum milestones- ambulating independently, passing flatus, tolerating PO intake, lochia light, voiding spontaneously, and pain well controlled with PO meds.       Dispo  OK for DC today    - Follow up with primary OB in:       - 4-6 weeks for post-partum visit       Pertinent Physical Exam At Time of Discharge  General: well appearing, well nourished, postpartum  Obstetric: fundus firm below umbilicus, lochia light  Skin:  Warm, dry; no rashes/lesions/erythema  Neuro: A/Ox3, conversational, no gross motor deficit   GI: no distension, appropriately tender, soft  Respiratory: Even and unlabored on RA  Cardiovascular: Trace BLE edema; No erythema, warmth  Psych: appropriate mood and affect         Your medication list        START taking these medications        Instructions Last Dose Given Next Dose Due   acetaminophen 325 mg tablet  Commonly known as: Tylenol      Take 3 tablets (975 mg) by mouth every 6 hours.       ibuprofen 600 mg tablet      Take 1 tablet (600 mg) by mouth every 6 hours.       polyethylene glycol 17 gram/dose powder  Commonly known as: Glycolax, Miralax      Mix 17 g of powder (1 capful dissolved in 8  ounces of water) and drink once daily.              CONTINUE taking these medications        Instructions Last Dose Given Next Dose Due   famotidine 20 mg tablet  Commonly known as: Pepcid      Take 1 tablet (20 mg) by mouth 2 times a day.       prenatal vitamin (iron-folic) 27 mg iron-800 mcg folic acid tablet      Take 1 tablet by mouth once daily.                 Where to Get Your Medications        These medications were sent to [facility]  [address] [place] [address]      Hours: 8:30 AM to 5 PM Mon-Fri Phone: [phone]   acetaminophen 325 mg tablet  ibuprofen 600 mg tablet  polyethylene glycol 17 gram/dose powder           Outpatient Follow-Up  Future Appointments   Date Time Provider Department Center   2/20/2025 11:30 AM Eugenia Guadarrama MD [id number] spent 15 minutes in the professional and overall care of this patient    Eugenia Denton, APRN-CNP

## 2025-02-06 NOTE — PROGRESS NOTES
Marilou Trujillo is a 24 y.o. female on day 3 of admission presenting with  (vaginal birth after ) ([facility]).    Social Work Assessment       Patient: Marilou Trujillo   Address: [address] [place] [address] [place] [address]       Referral Reason: risk screen    Prenatal Care: [facility]     Name: Flaco Trujillo   : 2024  FOB: Taras ()    Other Children: Laura Trujillo  2023    Household Composition: pt, Taras, Laura, Flaco and pt's parents     IPV/DV or Safety Concerns: none    Car-Seat: yes  Safe Sleep Space: yes  Safe Sleep Education: provided education    Transportation Concerns: none    School/Work/Income: [employer]    Insurance: [employer]     Mental Health Diagnoses: past hx of disordered eating and anxiety as an adolescent. No current mental health concerns.  Medication(s):   Counseling: denied     Supports:     Substance Use History: n/a    Toxicology Screens: n/a    Department of Children and Family Services (DCFS): denied any involvement       Assessment:   Met with pt at bedside. Pt presents as cooperative and engaged. Pt states she is coping and recovering well. Pt verbalized some interest in WIC and food stamp information.  provided these resources to pt via email. SW also provided pt with resource on baby supplies. No other concerns at this time.     Plan:   Pt is cleared for discharge from  perspective.     Signature:       BRYLEE M. JONES, LSW

## 2025-02-06 NOTE — CARE PLAN
The patient's goals for the shift include Rest and bond with baby    The clinical goals for the shift include Adequate pain control, normal VS (BP<160/110), normal transition to post partum    Problem: Postpartum  Goal: Experiences normal postpartum course  Outcome: Progressing  Goal: Appropriate maternal -  bonding  Outcome: Progressing  Goal: Establish and maintain infant feeding pattern for adequate nutrition  Outcome: Progressing  Goal: No s/sx infection  Outcome: Progressing  Goal: No s/sx of hemorrhage  Outcome: Progressing  Goal: Minimal s/sx of HDP and BP<160/110  Outcome: Progressing     Problem: Pain - Adult  Goal: Verbalizes/displays adequate comfort level or baseline comfort level  Outcome: Progressing     Problem: Safety - Adult  Goal: Free from fall injury  Outcome: Progressing     VS stable, pain well controlled, minimal bleeding, voiding, breast feeding

## 2025-02-08 LAB
BLOOD EXPIRATION DATE: NORMAL
DISPENSE STATUS: NORMAL
PRODUCT BLOOD TYPE: 5100
PRODUCT CODE: NORMAL
UNIT ABO: NORMAL
UNIT NUMBER: NORMAL
UNIT RH: NORMAL
UNIT VOLUME: 350
XM INTEP: NORMAL

## 2025-02-17 NOTE — ASSESSMENT & PLAN NOTE
Patient is doing well postpartum. She was advised to slowly increase activity. Pelvic rest is recommended until 6 weeks postpartum. She was advised to continue prenatal vitamins and to assure adequate hydration.   Contraception was discussed and declined for now.  She was advised to call the office with any concerning symptom, worsening of pain or bleeding, concern for postpartum depression or anxiety.   Follow up is planned in 4 weeks.

## 2025-02-17 NOTE — PROGRESS NOTES
Postpartum Progress Note    Assessment/Plan   Marilou Kaufman is a 24 y.o.,  presenting for postpartum visit. Induction of labor occurred at 40.6 weeks gestation. She progressed to a successful  on 2025. Postpartum course was uncomplicated.  She notes some external vaginal itching and external burning with urination. She did not have stitches at delivery. We discussed treated for presumed yeast.       Problem List       No episode was linked to this visit.          Hospital course: no complications       Subjective         She reports no breast or nursing problems  She denies emotional concerns today   Her plan for contraception is none           Allergies:   Patient has no known allergies.         Physical Exam:  Physical Exam  Constitutional:       Appearance: Normal appearance.   HENT:      Head: Normocephalic and atraumatic.   Pulmonary:      Effort: Pulmonary effort is normal.   Abdominal:      Palpations: Abdomen is soft.   Neurological:      General: No focal deficit present.      Mental Status: She is alert and oriented to person, place, and time.   Skin:     General: Skin is warm and dry.      Findings: No rash.   Psychiatric:         Mood and Affect: Mood normal.          Problem List Items Addressed This Visit       Routine postpartum follow-up    Overview     Induction of labor occurred at 40.6 weeks gestation. She progressed to a successful  on 2025. Postpartum course was uncomplicated.         Current Assessment & Plan     Patient is doing well postpartum. She was advised to slowly increase activity. Pelvic rest is recommended until 6 weeks postpartum. She was advised to continue prenatal vitamins and to assure adequate hydration.   Contraception was discussed and declined for now.  She was advised to call the office with any concerning symptom, worsening of pain or bleeding, concern for postpartum depression or anxiety.   Follow up is planned in 4 weeks.            Other Visit Diagnoses       Yeast infection    -  Primary    Relevant Medications    fluconazole (Diflucan) 150 mg tablet

## 2025-02-20 ENCOUNTER — APPOINTMENT (OUTPATIENT)
Dept: OBSTETRICS AND GYNECOLOGY | Facility: CLINIC | Age: 25
End: 2025-02-20
Payer: COMMERCIAL

## 2025-02-20 VITALS — SYSTOLIC BLOOD PRESSURE: 100 MMHG | BODY MASS INDEX: 22.48 KG/M2 | WEIGHT: 119 LBS | DIASTOLIC BLOOD PRESSURE: 60 MMHG

## 2025-02-20 DIAGNOSIS — B37.9 YEAST INFECTION: Primary | ICD-10-CM

## 2025-02-20 PROCEDURE — 99213 OFFICE O/P EST LOW 20 MIN: CPT | Performed by: OBSTETRICS & GYNECOLOGY

## 2025-02-20 RX ORDER — FLUCONAZOLE 150 MG/1
150 TABLET ORAL ONCE
Qty: 2 TABLET | Refills: 0 | Status: SHIPPED | OUTPATIENT
Start: 2025-02-20 | End: 2025-02-20

## 2025-02-20 ASSESSMENT — EDINBURGH POSTNATAL DEPRESSION SCALE (EPDS)
I HAVE BEEN ABLE TO LAUGH AND SEE THE FUNNY SIDE OF THINGS: AS MUCH AS I ALWAYS COULD
THINGS HAVE BEEN GETTING ON TOP OF ME: NO, I HAVE BEEN COPING AS WELL AS EVER
TOTAL SCORE: 2
I HAVE FELT SCARED OR PANICKY FOR NO GOOD REASON: NO, NOT AT ALL
THE THOUGHT OF HARMING MYSELF HAS OCCURRED TO ME: NEVER
I HAVE BEEN SO UNHAPPY THAT I HAVE BEEN CRYING: NO, NEVER
I HAVE LOOKED FORWARD WITH ENJOYMENT TO THINGS: AS MUCH AS I EVER DID
I HAVE BEEN ANXIOUS OR WORRIED FOR NO GOOD REASON: YES, SOMETIMES
I HAVE BEEN SO UNHAPPY THAT I HAVE HAD DIFFICULTY SLEEPING: NOT AT ALL
I HAVE FELT SAD OR MISERABLE: NO, NOT AT ALL
I HAVE BLAMED MYSELF UNNECESSARILY WHEN THINGS WENT WRONG: NO, NEVER

## 2025-02-26 ENCOUNTER — APPOINTMENT (OUTPATIENT)
Dept: OBSTETRICS AND GYNECOLOGY | Facility: CLINIC | Age: 25
End: 2025-02-26
Payer: COMMERCIAL

## 2025-02-28 ENCOUNTER — APPOINTMENT (OUTPATIENT)
Dept: OBSTETRICS AND GYNECOLOGY | Facility: CLINIC | Age: 25
End: 2025-02-28
Payer: COMMERCIAL

## 2025-03-01 NOTE — PROGRESS NOTES
Postpartum Progress Note    Assessment/Plan   Marilou Kaufman is a 24 y.o.,  presenting for postpartum visit. Induction of labor occurred at 40.6 weeks gestation. She progressed to a successful  on 2025. Postpartum course was uncomplicated.         Problem List       No episode was linked to this visit.          Hospital course: no complications       Subjective         She reports no breast or nursing problems  She denies emotional concerns today   Her plan for contraception is none           Allergies:   Patient has no known allergies.         Physical Exam:  Physical Exam  Constitutional:       Appearance: Normal appearance.   Genitourinary:      Bladder, rectum and urethral meatus normal.      Right Labia: No rash or lesions.     Left Labia: No lesions or rash.     No vaginal discharge.      No vaginal prolapse present.     No vaginal atrophy present.       Right Adnexa: not tender and no mass present.     Left Adnexa: not tender and no mass present.     No cervical discharge or lesion.      Uterus is not enlarged or tender.      Pelvic exam was performed with patient in the lithotomy position.   HENT:      Head: Normocephalic and atraumatic.      Nose: Nose normal.   Cardiovascular:      Rate and Rhythm: Normal rate and regular rhythm.      Heart sounds: Normal heart sounds.   Pulmonary:      Effort: Pulmonary effort is normal.      Breath sounds: Normal breath sounds.   Abdominal:      Palpations: Abdomen is soft.   Musculoskeletal:      Cervical back: Neck supple.   Neurological:      General: No focal deficit present.      Mental Status: She is alert and oriented to person, place, and time.   Skin:     General: Skin is warm and dry.      Findings: No rash.   Psychiatric:         Mood and Affect: Mood normal.          Problem List Items Addressed This Visit          Medium    Routine postpartum follow-up - Primary    Overview     Induction of labor occurred at 40.6 weeks gestation.  She progressed to a successful  on 2025. Postpartum course was uncomplicated.         Current Assessment & Plan     Patient is doing well postpartum. She may return to normal activity. She was advised to continue prenatal vitamins and to assure adequate hydration.   Contraception was discussed and declined. They plan another pregnancy within 2 years. Prenatal vitamins and adequate calcium intake are recommended.  She was advised to call the office with any concerning symptom, worsening of pain or bleeding, concern for postpartum depression or anxiety.   Follow up is planned  for annual exam.

## 2025-03-01 NOTE — ASSESSMENT & PLAN NOTE
Patient is doing well postpartum. She may return to normal activity. She was advised to continue prenatal vitamins and to assure adequate hydration.   Contraception was discussed and declined. They plan another pregnancy within 2 years. Prenatal vitamins and adequate calcium intake are recommended.  She was advised to call the office with any concerning symptom, worsening of pain or bleeding, concern for postpartum depression or anxiety.   Follow up is planned  for annual exam.

## 2025-03-19 ENCOUNTER — TREATMENT (OUTPATIENT)
Dept: PHYSICAL THERAPY | Facility: CLINIC | Age: 25
End: 2025-03-19
Payer: COMMERCIAL

## 2025-03-19 ENCOUNTER — APPOINTMENT (OUTPATIENT)
Dept: OBSTETRICS AND GYNECOLOGY | Facility: CLINIC | Age: 25
End: 2025-03-19
Payer: COMMERCIAL

## 2025-03-19 VITALS — SYSTOLIC BLOOD PRESSURE: 112 MMHG | DIASTOLIC BLOOD PRESSURE: 80 MMHG | WEIGHT: 119 LBS | BODY MASS INDEX: 22.48 KG/M2

## 2025-03-19 DIAGNOSIS — M62.81 MUSCLE WEAKNESS (GENERALIZED): Primary | ICD-10-CM

## 2025-03-19 DIAGNOSIS — O26.893 PREGNANCY RELATED HIP PAIN IN THIRD TRIMESTER, ANTEPARTUM (HHS-HCC): ICD-10-CM

## 2025-03-19 DIAGNOSIS — M25.559 PREGNANCY RELATED HIP PAIN IN THIRD TRIMESTER, ANTEPARTUM (HHS-HCC): ICD-10-CM

## 2025-03-19 PROCEDURE — 97110 THERAPEUTIC EXERCISES: CPT | Mod: GP

## 2025-03-19 PROCEDURE — 0503F POSTPARTUM CARE VISIT: CPT | Performed by: OBSTETRICS & GYNECOLOGY

## 2025-03-19 PROCEDURE — 97140 MANUAL THERAPY 1/> REGIONS: CPT | Mod: GP

## 2025-03-19 ASSESSMENT — EDINBURGH POSTNATAL DEPRESSION SCALE (EPDS)
I HAVE FELT SCARED OR PANICKY FOR NO GOOD REASON: NO, NOT MUCH
I HAVE BLAMED MYSELF UNNECESSARILY WHEN THINGS WENT WRONG: NOT VERY OFTEN
THE THOUGHT OF HARMING MYSELF HAS OCCURRED TO ME: NEVER
I HAVE BEEN ANXIOUS OR WORRIED FOR NO GOOD REASON: HARDLY EVER
TOTAL SCORE: 6
THINGS HAVE BEEN GETTING ON TOP OF ME: YES, SOMETIMES I HAVEN'T BEEN COPING AS WELL AS USUAL
I HAVE BEEN SO UNHAPPY THAT I HAVE BEEN CRYING: NO, NEVER
I HAVE FELT SAD OR MISERABLE: NOT VERY OFTEN
I HAVE LOOKED FORWARD WITH ENJOYMENT TO THINGS: AS MUCH AS I EVER DID
I HAVE BEEN SO UNHAPPY THAT I HAVE HAD DIFFICULTY SLEEPING: NOT AT ALL
I HAVE BEEN ABLE TO LAUGH AND SEE THE FUNNY SIDE OF THINGS: AS MUCH AS I ALWAYS COULD

## 2025-03-19 NOTE — PROGRESS NOTES
PHYSICAL THERAPY   TREATMENT NOTE    Patient Name:  Marilou Kaufman   Patient MRN: [id number]  Date: 25    Time Calculation  Start Time: 1212  Stop Time: 1254  Time Calculation (min): 42 min    Insurance:  Visit number: 4  Insurance Type: Medical [facility]  Approved # of visits: [place]  Authorization Needed: no  Cert Date Ends On: n/a    General:  Reason for visit: pregnancy labor and delivery   Referred by: Dr. Guadarrama    Therapy diagnoses:   1. Muscle weakness (generalized)  Follow Up In Physical Therapy      2. Pregnancy related hip pain in third trimester, antepartum (HHS-HCC)  Follow Up In Physical Therapy    Follow Up In Physical Therapy             Assessment:    Anticipate pt has baseline dysfunction of the left hip impacting overall pelvic girdle performance. Otherwise, pt has appropriate pelvic floor contraction and control of her pelvic girdle.   Pain levels were unchanged at the end of the treatment.    Plan:  1) Continue building strength and post partum status  2) Address pelvic pain if should arise     Subjective:  Pt had a successful  25. She was induced with pitocin, balloon catheter. She pushed for about an hour, no tearing, she was on her back when delivered. She is breastfeeding, some pumping. She trialed intercourse, no pain with insertion.   Pain (0-10): 0    Precautions:  PMH: scoliosis   Fall Risk: no     Objective:    Treatment Performed:   Therapeutic Exercise:  10 minutes    Therapeutic Activity:   minutes     Self Care:   minutes    Manual Therapy: 32 minutes  External and internal assessment explained. Verbal consent obtained to proceed with vaginal or rectal exam and consented for the treatment approaches today. Patient understands they have power and right to stop examination at any time. A chaperone was offered to patient and patient declined.    Vaginal Observation:  Voluntary Contraction: +  Voluntary Relaxation: +  Involuntary Contraction: +   Involuntary  Relaxation: +  No abnormal erythema or discharge  Mild organ descent at rest but no change with curl up or cough     External vaginal palpation:   1 o'clock (ischiocavernosus)*  2 o'clock (bulbocavernosus)*  3 o'clock (superficial transverse perineal)  4 o'clock (pubococcygeus)  5 o'clock (iliococcygeus)  6 o'clock (coccyx)  7 o'clock (iliococcygeus)  8 o'clock (pubococcygeus)  9 o'clock (superficial transverse perineal)  10 o'clock (bulbocavernosus)  11 o'clock (ischiocavernosus)  12 o'clock (pubic symphysis inferior angle)  Obturator: L   Piriformis:  Hamstring:   Adductor:    Internal vaginal palpation:  1 o'clock (ischiocavernosus)  2 o'clock (bulbocavernosus)  3 o'clock (superficial transverse perineal)  4 o'clock (pubococcygeus)  5 o'clock (iliococcygeus)  6 o'clock (coccyx)  7 o'clock (iliococcygeus)  8 o'clock (pubococcygeus)  9 o'clock (superficial transverse perineal)  10 o'clock (bulbocavernosus)  11 o'clock (ischiocavernosus)  12 o'clock (pubic symphysis inferior angle)  Obturator: L   Piriformis: L     3 finger james umbilical and immediately inferior diastasis recti  No sensation changes of c section scar, appropriate mobility     Pelvic Floor MMT Grade  0/zero: no palpable contraction/squeeze  1/trace: flicker of squeeze or contraction  2/poor: squeeze pressure asymmetrical or felt at various points- no lift or displacement  3/fair: squeeze pressure (contraction) and lift or displacement  4/good: squeeze pressure (contraction) and lift or displacement from anterior, posterior, and side walls  5/strong: full circumference of finger compressed, displaced with an inward pull    [place] PERF(Power/Endurance/Repetitions/Fast Twitch)  4/5/1/7    Neuromuscular Re-education:   minutes    Gait Training:    minutes    Modalities:    minutes    Dry Needling:   minutes    Goals:  1. Pt will be independent in HEP to maximize PT POC --> CONTINUE   2. Pt will be able to improve worst pain severity on NPRS by >2  points MCID --> PROGRESSING, CONTINUE    3. Pt will improve NIH CPSI by >50% raw score --> CONTINUE   4. Pt will demonstrate Sahrmann core strength to 5/5 to allow for risk reduction when lifting

## 2025-03-26 ENCOUNTER — TREATMENT (OUTPATIENT)
Dept: PHYSICAL THERAPY | Facility: CLINIC | Age: 25
End: 2025-03-26
Payer: COMMERCIAL

## 2025-03-26 DIAGNOSIS — M25.559 PREGNANCY RELATED HIP PAIN IN THIRD TRIMESTER, ANTEPARTUM (HHS-HCC): ICD-10-CM

## 2025-03-26 DIAGNOSIS — O26.893 PREGNANCY RELATED HIP PAIN IN THIRD TRIMESTER, ANTEPARTUM (HHS-HCC): ICD-10-CM

## 2025-03-26 DIAGNOSIS — M62.81 MUSCLE WEAKNESS (GENERALIZED): ICD-10-CM

## 2025-03-26 PROCEDURE — 97110 THERAPEUTIC EXERCISES: CPT | Mod: GP

## 2025-03-26 NOTE — PROGRESS NOTES
PHYSICAL THERAPY   TREATMENT NOTE    Patient Name:  Marilou Kaufman   Patient MRN: [id number]  Date: 03/26/25    Time Calculation  Start Time: 1546  Stop Time: 1618  Time Calculation (min): 32 min    Insurance:  Visit number: 5  Insurance Type: Medical [facility]  Approved # of visits: [place]  Authorization Needed: no  Cert Date Ends On: n/a    General:  Reason for visit: pregnancy labor and delivery   Referred by: Dr. Guadarrama    Therapy diagnoses:   1. Pregnancy related hip pain in third trimester, antepartum (HHS-HCC)  Follow Up In Physical Therapy      2. Muscle weakness (generalized)  Follow Up In Physical Therapy             Assessment:    Anticipate pt has baseline dysfunction of the left hip impacting overall pelvic girdle performance. Otherwise, pt has appropriate pelvic floor contraction and control of her pelvic girdle.   Pain levels were unchanged at the end of the treatment.    Plan:  1) Continue building strength and post partum status  2) Access Code: [email or web address]  URL: [email or web address]  Date: 03/26/2025  Prepared by: Helen Samuel    Exercises  - Sidelying Hip Abduction  - 1 x daily - 7 x weekly - 2 sets - 10 reps - 5 seconds  hold  - Quadruped Thoracic Rotation Full Range with Hand on Neck  - 1 x daily - 7 x weekly - 2 sets - 10 reps - 5 seconds  hold  - Quadruped Rocking Slow  - 1 x daily - 7 x weekly - 2 sets - 10 reps - 5 seconds  hold  - Cat Cow  - 1 x daily - 7 x weekly - 2 sets - 10 reps - 5 seconds  hold  - Single Leg Bridge  - 1 x daily - 7 x weekly - 2 sets - 10 reps - 5 seconds  hold  - Supine Piriformis Stretch with Leg Straight  - 1 x daily - 7 x weekly - 2 sets - 10 reps - 5 seconds  hold  - Beginner Front Arm Support  - 1 x daily - 7 x weekly - 2 sets - 10 reps - 5 seconds  hold    Subjective:  Baby is 7 weeks now  Feeling no pain   No questions re: exercises     Pain (0-10): 0    Precautions:  PMH: scoliosis   Fall Risk: no     Objective:    Treatment  "Performed:   Therapeutic Exercise:  32 minutes  Nustep x 5 minutes  Quadruped chicken wing 2 x 10   Quadruped hip extension 2 x 10   Cat cow with PF awareness x 2 minutes   Sit to stand green theraband x 10   \" \" With kegel contraction   Core single leg lift and extend x 2 mins   Bridge with march 2 x 10   Single leg lift with small [place] x 10  Piriformis stretch 30 seconds x 4    Therapeutic Activity:   minutes     Self Care:   minutes    Manual Therapy:   minutes    Neuromuscular Re-education:   minutes    Gait Training:    minutes    Modalities:    minutes    Dry Needling:   minutes    Goals:  1. Pt will be independent in HEP to maximize PT POC --> CONTINUE   2. Pt will be able to improve worst pain severity on NPRS by >2 points MCID --> PROGRESSING, CONTINUE    3. Pt will improve NIH CPSI by >50% raw score --> CONTINUE   4. Pt will demonstrate Sahrmann core strength to 5/5 to allow for risk reduction when lifting      "

## 2025-04-15 ENCOUNTER — TREATMENT (OUTPATIENT)
Dept: PHYSICAL THERAPY | Facility: CLINIC | Age: 25
End: 2025-04-15
Payer: COMMERCIAL

## 2025-04-15 DIAGNOSIS — O26.893 PREGNANCY RELATED HIP PAIN IN THIRD TRIMESTER, ANTEPARTUM (HHS-HCC): ICD-10-CM

## 2025-04-15 DIAGNOSIS — M25.559 PREGNANCY RELATED HIP PAIN IN THIRD TRIMESTER, ANTEPARTUM (HHS-HCC): ICD-10-CM

## 2025-04-15 DIAGNOSIS — M62.81 MUSCLE WEAKNESS (GENERALIZED): ICD-10-CM

## 2025-04-15 PROCEDURE — 97535 SELF CARE MNGMENT TRAINING: CPT | Mod: GP

## 2025-04-15 NOTE — PROGRESS NOTES
PHYSICAL THERAPY   TREATMENT NOTE    Patient Name:  Marilou Kaufman   Patient MRN: [id number]  Date: 04/15/25    Time Calculation  Start Time: 1415  Stop Time: 1433  Time Calculation (min): 18 min    Insurance:  Visit number: 6  Insurance Type: Medical [facility]  Approved # of visits: [place]  Authorization Needed: no  Cert Date Ends On: n/a    General:  Reason for visit: pregnancy labor and delivery   Referred by: Dr. Guadarrama    Therapy diagnoses:   1. Pregnancy related hip pain in third trimester, antepartum (HHS-HCC)  Follow Up In Physical Therapy      2. Muscle weakness (generalized)  Follow Up In Physical Therapy             Assessment:    Pt extremely limited by , understandably. She is having more anterior pain that appears to be muscle spasm with trunk flexion in standing.   Pain levels were unchanged at the end of the treatment.    Plan:  1) Pt to return to the clinic as needed   2) 3-5 minutes anterior groin massage daily   Access Code: [email or web address]  URL: [email or web address]  Date: 04/15/2025  Prepared by: Helen Samuel    Exercises  - Standing Hip Flexor Stretch  - 1 x daily - 7 x weekly - 2 sets - 10 reps - 5 seconds  hold  - Modified Fitz Stretch  - 1 x daily - 7 x weekly - 2 sets - 10 reps - 5 seconds  hold  - Hip Flexor Stretch with Chair  - 1 x daily - 7 x weekly - 2 sets - 10 reps - 5 seconds  hold  - Sit to Stand with Resistance Around Legs  - 1 x daily - 7 x weekly - 2 sets - 10 reps - 5 seconds  hold  - Marching Bridge  - 1 x daily - 7 x weekly - 2 sets - 10 reps - 5 seconds  hold  - Clamshell  - 1 x daily - 7 x weekly - 2 sets - 10 reps - 5 seconds  hold  - Beginner Front Arm Support  - 1 x daily - 7 x weekly - 2 sets - 10 reps - 5 seconds  hold  - Cat Cow  - 1 x daily - 7 x weekly - 2 sets - 10 reps - 5 seconds  hold  - Quadruped Thoracic Rotation Full Range with Hand on Neck  - 1 x daily - 7 x weekly - 2 sets - 10 reps - 5 seconds  hold  - Hooklying Small  "March  - 1 x daily - 7 x weekly - 2 sets - 10 reps - 5 seconds  hold  - Clamshell  - 1 x daily - 7 x weekly - 2 sets - 10 reps - 5 seconds  hold    Subjective:  No questions re: exercises   Some pain on the left hip when she is standing on the right or when putting baby into the crib. It feels like a snap but doesn't happen all the time.  Pain (0-10): 0    Precautions:  PMH: scoliosis   Fall Risk: no     Objective:    Treatment Performed:   Therapeutic Exercise:    minutes    Therapeutic Activity:   minutes     Self Care: 18 minutes  Review:  Nustep x 5 minutes  Quadruped chicken wing 2 x 10   Quadruped hip extension 2 x 10   Cat cow with PF awareness x 2 minutes   Sit to stand green theraband x 10   \" \" With kegel contraction   Core single leg lift and extend x 2 mins   Bridge with march 2 x 10   Single leg lift with small [place] x 10  Piriformis stretch 30 seconds x 4    Manual Therapy:   minutes    Neuromuscular Re-education:   minutes    Gait Training:    minutes    Modalities:    minutes    Dry Needling:   minutes    Goals:  1. Pt will be independent in HEP to maximize PT POC --> CONTINUE   2. Pt will be able to improve worst pain severity on NPRS by >2 points MCID --> PROGRESSING, CONTINUE    3. Pt will improve NIH CPSI by >50% raw score --> CONTINUE   4. Pt will demonstrate Sahrmann core strength to 5/5 to allow for risk reduction when lifting      "

## 2025-07-22 ENCOUNTER — TELEPHONE (OUTPATIENT)
Dept: OBSTETRICS AND GYNECOLOGY | Facility: CLINIC | Age: 25
End: 2025-07-22
Payer: COMMERCIAL

## 2025-07-22 DIAGNOSIS — O36.80X0 PREGNANCY WITH INCONCLUSIVE FETAL VIABILITY, SINGLE OR UNSPECIFIED FETUS: ICD-10-CM

## 2025-08-01 ENCOUNTER — HOSPITAL ENCOUNTER (OUTPATIENT)
Dept: RADIOLOGY | Facility: CLINIC | Age: 25
Discharge: HOME | End: 2025-08-01
Payer: COMMERCIAL

## 2025-08-01 DIAGNOSIS — O36.80X0 PREGNANCY WITH INCONCLUSIVE FETAL VIABILITY, SINGLE OR UNSPECIFIED FETUS: ICD-10-CM

## 2025-08-01 PROCEDURE — 76801 OB US < 14 WKS SINGLE FETUS: CPT

## 2025-08-04 PROBLEM — O34.219 VBAC (VAGINAL BIRTH AFTER CESAREAN) (HHS-HCC): Status: RESOLVED | Noted: 2025-02-03 | Resolved: 2025-08-04

## 2025-08-04 PROBLEM — O09.623 YOUNG MULTIGRAVIDA IN THIRD TRIMESTER (HHS-HCC): Status: RESOLVED | Noted: 2024-06-19 | Resolved: 2025-08-04

## 2025-08-04 PROBLEM — O99.713 PRURITUS OF PREGNANCY IN THIRD TRIMESTER (HHS-HCC): Status: RESOLVED | Noted: 2025-01-16 | Resolved: 2025-08-04

## 2025-08-04 PROBLEM — L29.9 PRURITUS OF PREGNANCY IN THIRD TRIMESTER (HHS-HCC): Status: RESOLVED | Noted: 2025-01-16 | Resolved: 2025-08-04

## 2025-08-04 PROBLEM — O41.8X10 SUBCHORIONIC HEMATOMA IN FIRST TRIMESTER (HHS-HCC): Status: ACTIVE | Noted: 2025-08-04

## 2025-08-04 PROBLEM — Z3A.40 40 WEEKS GESTATION OF PREGNANCY (HHS-HCC): Status: RESOLVED | Noted: 2024-06-19 | Resolved: 2025-08-04

## 2025-08-04 PROBLEM — Z34.90 ENCOUNTER FOR INDUCTION OF LABOR: Status: RESOLVED | Noted: 2025-02-05 | Resolved: 2025-08-04

## 2025-08-04 PROBLEM — O46.8X1 SUBCHORIONIC HEMATOMA IN FIRST TRIMESTER (HHS-HCC): Status: ACTIVE | Noted: 2025-08-04

## 2025-08-04 PROBLEM — O99.820 GROUP B STREPTOCOCCUS CARRIER STATE AFFECTING PREGNANCY: Status: RESOLVED | Noted: 2025-01-07 | Resolved: 2025-08-04

## 2025-08-20 ENCOUNTER — APPOINTMENT (OUTPATIENT)
Dept: OBSTETRICS AND GYNECOLOGY | Facility: CLINIC | Age: 25
End: 2025-08-20
Payer: COMMERCIAL

## 2025-08-20 VITALS — DIASTOLIC BLOOD PRESSURE: 70 MMHG | SYSTOLIC BLOOD PRESSURE: 118 MMHG | BODY MASS INDEX: 21.73 KG/M2 | WEIGHT: 115 LBS

## 2025-08-20 DIAGNOSIS — O46.8X1 SUBCHORIONIC HEMATOMA IN FIRST TRIMESTER, SINGLE OR UNSPECIFIED FETUS (HHS-HCC): ICD-10-CM

## 2025-08-20 DIAGNOSIS — O34.219 UTERINE SCAR FROM PREVIOUS CESAREAN DELIVERY COMPLICATING PREGNANCY (HHS-HCC): Primary | ICD-10-CM

## 2025-08-20 DIAGNOSIS — Z3A.11 11 WEEKS GESTATION OF PREGNANCY (HHS-HCC): ICD-10-CM

## 2025-08-20 DIAGNOSIS — Z34.81 MULTIGRAVIDA IN FIRST TRIMESTER (HHS-HCC): ICD-10-CM

## 2025-08-20 DIAGNOSIS — O41.8X10 SUBCHORIONIC HEMATOMA IN FIRST TRIMESTER, SINGLE OR UNSPECIFIED FETUS (HHS-HCC): ICD-10-CM

## 2025-08-20 PROCEDURE — 0500F INITIAL PRENATAL CARE VISIT: CPT | Performed by: OBSTETRICS & GYNECOLOGY

## 2025-08-20 PROCEDURE — 86850 RBC ANTIBODY SCREEN: CPT

## 2025-08-20 PROCEDURE — 85027 COMPLETE CBC AUTOMATED: CPT

## 2025-08-20 PROCEDURE — 86900 BLOOD TYPING SEROLOGIC ABO: CPT

## 2025-08-20 PROCEDURE — 86901 BLOOD TYPING SEROLOGIC RH(D): CPT

## 2025-08-20 SDOH — ECONOMIC STABILITY: FOOD INSECURITY: WITHIN THE PAST 12 MONTHS, YOU WORRIED THAT YOUR FOOD WOULD RUN OUT BEFORE YOU GOT MONEY TO BUY MORE.: NEVER TRUE

## 2025-08-20 SDOH — ECONOMIC STABILITY: FOOD INSECURITY: WITHIN THE PAST 12 MONTHS, THE FOOD YOU BOUGHT JUST DIDN'T LAST AND YOU DIDN'T HAVE MONEY TO GET MORE.: NEVER TRUE

## 2025-08-20 ASSESSMENT — ENCOUNTER SYMPTOMS
COUGH: 0
CONSTIPATION: 0
JOINT SWELLING: 0
AGITATION: 0
APNEA: 0
ACTIVITY CHANGE: 0
HEMATURIA: 0
DIZZINESS: 0
DIARRHEA: 0

## 2025-08-21 ENCOUNTER — APPOINTMENT (OUTPATIENT)
Dept: LAB | Facility: HOSPITAL | Age: 25
End: 2025-08-21
Payer: COMMERCIAL

## 2025-08-21 LAB
ABO GROUP (TYPE) IN BLOOD: NORMAL
ANTIBODY SCREEN: NORMAL
ERYTHROCYTE [DISTWIDTH] IN BLOOD BY AUTOMATED COUNT: 12.5 % (ref 11.5–14.5)
HCT VFR BLD AUTO: 42.2 % (ref 36–46)
HGB BLD-MCNC: 13.6 G/DL (ref 12–16)
MCH RBC QN AUTO: 29.1 PG (ref 26–34)
MCHC RBC AUTO-ENTMCNC: 32.2 G/DL (ref 32–36)
MCV RBC AUTO: 90 FL (ref 80–100)
NRBC BLD-RTO: 0 /100 WBCS (ref 0–0)
PLATELET # BLD AUTO: 324 X10*3/UL (ref 150–450)
RBC # BLD AUTO: 4.68 X10*6/UL (ref 4–5.2)
REFLEX ADDED, ANEMIA PANEL: NORMAL
RH FACTOR (ANTIGEN D): NORMAL
WBC # BLD AUTO: 10.9 X10*3/UL (ref 4.4–11.3)

## 2025-08-22 LAB
BACTERIA UR CULT: NORMAL
C TRACH RRNA SPEC QL NAA+PROBE: NOT DETECTED
EST. AVERAGE GLUCOSE BLD GHB EST-MCNC: 97 MG/DL
EST. AVERAGE GLUCOSE BLD GHB EST-SCNC: 5.4 MMOL/L
HBA1C MFR BLD: 5 %
HBV SURFACE AG SERPL QL IA: NORMAL
HCV AB SERPL QL IA: NORMAL
HIV 1+2 AB+HIV1 P24 AG SERPL QL IA: NORMAL
HIV 1+2 AB+HIV1 P24 AG SERPL QL IA: NORMAL
N GONORRHOEA RRNA SPEC QL NAA+PROBE: NOT DETECTED
QUEST GC CT AMPLIFIED (ALWAYS MESSAGE): NORMAL
RUBV IGG SERPL IA-ACNC: 2.3 INDEX
T PALLIDUM AB SER QL IA: NORMAL

## 2025-08-25 LAB
EST. AVERAGE GLUCOSE BLD GHB EST-MCNC: 97 MG/DL
EST. AVERAGE GLUCOSE BLD GHB EST-SCNC: 5.4 MMOL/L
HBA1C MFR BLD: 5 %
HBV SURFACE AG SERPL QL IA: NORMAL
HCV AB SERPL QL IA: NORMAL
HIV 1+2 AB+HIV1 P24 AG SERPL QL IA: NORMAL
HIV 1+2 AB+HIV1 P24 AG SERPL QL IA: NORMAL
RUBV IGG SERPL IA-ACNC: 2.3 INDEX
T PALLIDUM AB SER QL IA: NEGATIVE

## 2025-08-27 ENCOUNTER — HOSPITAL ENCOUNTER (OUTPATIENT)
Dept: RADIOLOGY | Facility: CLINIC | Age: 25
Discharge: HOME | End: 2025-08-27
Payer: COMMERCIAL

## 2025-08-27 DIAGNOSIS — Z36.82 NUCHAL TRANSLUCENCY OF FETUS ON PRENATAL ULTRASOUND (HHS-HCC): ICD-10-CM

## 2025-08-27 DIAGNOSIS — O36.80X0 PREGNANCY WITH INCONCLUSIVE FETAL VIABILITY, SINGLE OR UNSPECIFIED FETUS: ICD-10-CM

## 2025-08-27 PROCEDURE — 76813 OB US NUCHAL MEAS 1 GEST: CPT

## 2025-08-28 LAB
COMMENTS - MP RESULT TYPE: NORMAL
SCAN RESULT: NORMAL

## 2025-09-24 ENCOUNTER — APPOINTMENT (OUTPATIENT)
Dept: OBSTETRICS AND GYNECOLOGY | Facility: CLINIC | Age: 25
End: 2025-09-24
Payer: COMMERCIAL

## 2026-03-31 ENCOUNTER — APPOINTMENT (OUTPATIENT)
Dept: OBSTETRICS AND GYNECOLOGY | Facility: CLINIC | Age: 26
End: 2026-03-31
Payer: COMMERCIAL